# Patient Record
Sex: MALE | URBAN - METROPOLITAN AREA
[De-identification: names, ages, dates, MRNs, and addresses within clinical notes are randomized per-mention and may not be internally consistent; named-entity substitution may affect disease eponyms.]

---

## 2018-07-25 ENCOUNTER — PAT TELEPHONE (OUTPATIENT)
Dept: PREADMISSION TESTING | Age: 68
End: 2018-07-25

## 2018-07-25 VITALS — WEIGHT: 280 LBS | BODY MASS INDEX: 35.94 KG/M2 | HEIGHT: 74 IN

## 2018-07-25 RX ORDER — CARVEDILOL 25 MG/1
25 TABLET ORAL 2 TIMES DAILY WITH MEALS
COMMUNITY

## 2018-07-25 RX ORDER — WARFARIN SODIUM 7.5 MG/1
7.5 TABLET ORAL DAILY
COMMUNITY

## 2018-07-25 RX ORDER — EZETIMIBE 10 MG/1
10 TABLET ORAL NIGHTLY
COMMUNITY

## 2018-07-25 RX ORDER — LISINOPRIL 10 MG/1
10 TABLET ORAL 2 TIMES DAILY
COMMUNITY

## 2018-07-25 RX ORDER — ATORVASTATIN CALCIUM 40 MG/1
40 TABLET, FILM COATED ORAL NIGHTLY
COMMUNITY

## 2018-07-25 RX ORDER — WARFARIN SODIUM 10 MG/1
10 TABLET ORAL SEE ADMIN INSTRUCTIONS
COMMUNITY

## 2018-07-25 RX ORDER — FUROSEMIDE 20 MG/1
20 TABLET ORAL DAILY
COMMUNITY

## 2018-07-25 RX ORDER — MULTIVIT WITH MINERALS/LUTEIN
1000 TABLET ORAL DAILY
COMMUNITY

## 2018-07-25 RX ORDER — SPIRONOLACTONE 25 MG/1
25 TABLET ORAL DAILY
COMMUNITY

## 2018-07-25 NOTE — PRE-PROCEDURE INSTRUCTIONS
[facility] time_1100___________        Surgery time___1230_________    Take the following medications with a sip of water: LISINOPRIL,COREG    Do not eat or drink anything after 12:00 midnight prior to your surgery. This includes water chewing gum, mints and ice chips. You may brush your teeth and gargle the morning of your surgery, but do not swallow the water     Please see your family doctor/pediatrician for a history and physical and/or concerning medications. Bring any test results/reports from your physicians office. If you are under the care of a heart doctor or specialist doctor, please be aware that you may be asked to them for clearance    You may be asked to stop blood thinners such as Coumadin, Plavix, Fragmin, Lovenox, etc., or any anti-inflammatories such as:  Aspirin, Ibuprofen, Advil, Naproxen prior to your surgery. We also ask that you stop any OTC medications such as fish oil, vitamin E, glucosamine, garlic, Multivitamins, COQ 10, etc.MAY TAKE TYLENOLWe ask that you do not smoke 24 hours prior to surgery  We ask that you do not  drink any alcoholic beverages 24 hours prior to surgery     You must make arrangements for a responsible adult to take you home after your surgery. For your safety you will not be allowed to leave alone or drive yourself home. Your surgery will be cancelled if you do not have a ride home. Also for your safety, it is strongly suggested that someone stay with you the first 24 hours after your surgery. A parent or legal guardian must accompany a child scheduled for surgery and plan to stay at the hospital until the child is discharged. Please do not bring other children with you. For your comfort, please wear simple loose fitting clothing to the hospital.  Please do not bring valuables.     Do not wear any make-up or nail polish on your fingers or toes      For your safety, please do not wear any jewelry or body piercing's on the day of surgery. All jewelry must be removed. If you have dentures, they will be removed before going to operating room. For your convenience, we will provide you with a container. If you wear contact lenses or glasses, they will be removed, please bring a case for them. If you have a living will and a durable power of  for healthcare, please bring in a copy. As part of our patient safety program to minimize surgical site infections, we ask you to do the following:    · Please notify your surgeon if you develop any illness between         now and the  day of your surgery. · This includes a cough, cold, fever, sore throat, nausea,         or vomiting, and diarrhea, etc.  ·  Please notify your surgeon if you experience dizziness, shortness         of breath or blurred vision between now and the time of your surgery. Do not shave your operative site 96 hours prior to surgery. For face and neck surgery, men may use an electric razor 48 hours   prior to surgery. You may shower the night before surgery or the morning of   your surgery with an antibacterial soap. You will need to bring a photo ID and insurance card    [facility] has an onsite pharmacy, would you like to utilize our pharmacy     If you will be staying overnight and use a C-pap machine, please bring   your C-pap to hospital     Our goal is to provide you with excellent care, therefore, visitors will be limited to two(2) in the room at a time so that we may focus on providing this care for you. Please contact pre-admission testing if you have any further questions. [facility] phone number:  [phone] [facility] fax number:  [phone]  Please note these are generalized instructions for all surgical cases, you may be provided with more specific instructions according to your surgery.

## 2018-07-26 ENCOUNTER — HOSPITAL ENCOUNTER (OUTPATIENT)
Dept: ENDOSCOPY | Age: 68
Discharge: HOME | End: 2018-07-26
Attending: INTERNAL MEDICINE | Admitting: INTERNAL MEDICINE

## 2018-07-26 VITALS
TEMPERATURE: 97.2 F | DIASTOLIC BLOOD PRESSURE: 77 MMHG | RESPIRATION RATE: 16 BRPM | WEIGHT: 283.18 LBS | SYSTOLIC BLOOD PRESSURE: 122 MMHG | OXYGEN SATURATION: 96 % | BODY MASS INDEX: 36.36 KG/M2 | HEART RATE: 66 BPM

## 2018-07-26 LAB
INR BLD: 1.2 (ref 0.86–1.14)
PROTHROMBIN TIME: 13.7 SEC (ref 9.8–13)

## 2018-07-26 RX ORDER — SODIUM CHLORIDE 0.9 % (FLUSH) 0.9 %
10 SYRINGE (ML) INJECTION EVERY 12 HOURS SCHEDULED
Status: DISCONTINUED | OUTPATIENT
Start: 2018-07-26 | End: 2018-07-27 | Stop reason: HOSPADM

## 2018-07-26 RX ORDER — SODIUM CHLORIDE 0.9 % (FLUSH) 0.9 %
10 SYRINGE (ML) INJECTION PRN
Status: DISCONTINUED | OUTPATIENT
Start: 2018-07-26 | End: 2018-07-27 | Stop reason: HOSPADM

## 2018-07-26 RX ORDER — ONDANSETRON 2 MG/ML
4 INJECTION INTRAMUSCULAR; INTRAVENOUS
Status: ACTIVE | OUTPATIENT
Start: 2018-07-26 | End: 2018-07-26

## 2018-07-26 RX ORDER — SODIUM CHLORIDE 9 MG/ML
INJECTION, SOLUTION INTRAVENOUS CONTINUOUS
Status: DISCONTINUED | OUTPATIENT
Start: 2018-07-26 | End: 2018-07-27 | Stop reason: HOSPADM

## 2018-07-26 RX ADMIN — SODIUM CHLORIDE: 9 INJECTION, SOLUTION INTRAVENOUS at 09:52

## 2018-07-26 ASSESSMENT — PAIN SCALES - GENERAL
PAINLEVEL_OUTOF10: 0

## 2018-07-26 ASSESSMENT — ENCOUNTER SYMPTOMS: SHORTNESS OF BREATH: 0

## 2018-07-26 ASSESSMENT — PAIN - FUNCTIONAL ASSESSMENT: PAIN_FUNCTIONAL_ASSESSMENT: 0-10

## 2018-07-26 ASSESSMENT — PAIN DESCRIPTION - PAIN TYPE
TYPE: SURGICAL PAIN
TYPE: SURGICAL PAIN

## 2018-07-26 NOTE — ANESTHESIA POST-OP
[facility] Department of Anesthesiology  Post-Anesthesia Note       Name:  Home Echols                                         Age:  74 y.o.   MRN:  [id number]     Last Vitals & Oxygen Saturation: /77   Pulse 66   Temp 97.2 °F (36.2 °C) (Temporal)   Resp 16   Wt 283 lb 2.9 oz (128.4 kg)   SpO2 96%   BMI 36.36 kg/m²   Patient Vitals for the past 4 hrs:   BP Temp Temp src Pulse Resp SpO2 Weight   07/26/18 1157 122/77 - - 66 16 96 % -   07/26/18 1130 105/61 97.2 °F (36.2 °C) Temporal 66 16 93 % -   07/26/18 1115 - - - - - 97 % -   07/26/18 1111 104/69 - - 72 22 - -   07/26/18 1110 99/71 - - 74 19 98 % -   07/26/18 1100 89/62 98.1 °F (36.7 °C) Temporal 73 20 95 % -   07/26/18 0939 119/72 96.8 °F (36 °C) Temporal 83 20 93 % 283 lb 2.9 oz (128.4 kg)       Level of consciousness: awake, alert and oriented    Respiratory: stable     Cardiovascular: stable     Hydration: stable     PONV: stable     Post-op pain: adequate analgesia    Post-op assessment: no apparent anesthetic complications    Complications:  none    Filipe Medley MD  July 26, 2018   12:16 PM

## 2018-07-26 NOTE — OP NOTE
cecum was identified by characteristic anatomy and ballottment. The ileocecal valve was identified. The preparation was fair. The terminal ileum was not visualized. The scope was then withdrawn back through the cecum, ascending, transverse, descending, sigmoid colon, and rectum. Careful circumferential examination of the mucosa in these areas demonstrated:    1) A 3 mm polyp in the proximal ascending colon was removed completely with biopsy polypectomy. 2) Mild right and left sided diverticulosis was noted. 3) A 3 mm polyp in the sigmoid colon was removed completely with biopsy polypectomy. The scope was then withdrawn into the rectum and retroflexed. The retroflexed view of the anal verge and rectum demonstrates small internal hemorrhoids. The scope was straightened, the colon was decompressed and the scope was withdrawn from the patient. The patient tolerated the procedure well and was taken to the PACU in good condition. Estimated blood loss: None    Impression:  See post-procedure diagnoses. Recommendations:  Await pathology. Repeat colonoscopy in 5 years. The patient had colon polyp(s) successfully removed today. Paulo Daugherty is a small risk for these sites to bleed for up to several weeks out from the procedure. The patient is instructed to call if there is any significant amounts of  rectal bleeding, abdominal pain, dizziness, or other complaints thought to be related to the procedure.       Antonio Garcia, [address] [facility] and [facility]  7/26/2018  [phone]

## 2018-07-26 NOTE — PROGRESS NOTES
From PACU. Alert and oriented. No c/o. Vss. abd soft.
To PACU by cart from Endo. On monitors. . VSS. Abd soft and rounded. Denies complaints. Resting quietly on left side.
VSS. Stable for transfer to ACU by cart. Denies complaints.
established preoperatively. 23.  The patient/caregiver demonstrates knowledge of the expected responses to the operative or invasive procedure. 20.  Patient/Caregiver has reduced anxiety. Interventions-Familiarize with environment and equipment.   21.  Other:  22.  Other:

## 2018-07-26 NOTE — H&P
Pre-operative History and Physical    Patient: Geovany Jacques  : 1950  Acct#:     Intended Procedure: Colonoscopy     HISTORY OF PRESENT ILLNESS:  The patient is a 74 y.o. male  who presents for/due to History of colon polyps. Surveillance colonoscopy.          Past Medical History:        Diagnosis Date    CAD (coronary artery disease)     Cardiac resynchronization therapy defibrillator (CRT-D) in place     CHF [facility]estive heart failure) ([facility] [address]     Hyperlipidemia     Hypertension     MI (myocardial infarction)     Wears glasses      Past Surgical History:        Procedure Laterality Date    CARDIAC DEFIBRILLATOR PLACEMENT      CRT    COLONOSCOPY      CORONARY ANGIOPLASTY WITH STENT PLACEMENT  2015    OTHER SURGICAL HISTORY Left     BENIGHN TUMOR REMOVED LEFT FOOT    PACEMAKER PLACEMENT  2015    ICD     Medications Prior to Admission:   Current Outpatient Prescriptions on File Prior to Encounter   Medication Sig Dispense Refill    atorvastatin (LIPITOR) 40 MG tablet Take 40 mg by mouth nightly      carvedilol (COREG) 25 MG tablet Take 25 mg by mouth 2 times daily (with meals)      ezetimibe (ZETIA) 10 MG tablet Take 10 mg by mouth nightly      furosemide (LASIX) 20 MG tablet Take 20 mg by mouth daily      lisinopril (PRINIVIL;ZESTRIL) 10 MG tablet Take 10 mg by mouth 2 times daily      spironolactone (ALDACTONE) 25 MG tablet Take 25 mg by mouth daily      aspirin 81 MG tablet Take 81 mg by mouth daily      Ascorbic Acid (VITAMIN C) 1000 MG tablet Take 1,000 mg by mouth daily      Cholecalciferol (VITAMIN D3) 5000 units TABS Take 1 tablet by mouth daily      warfarin (COUMADIN) 7.5 MG tablet Take 7.5 mg by mouth daily 6DAYS A WEEK-SUN,TUES,WED,THURS,FRI,SAT      warfarin (COUMADIN) 10 MG tablet Take 10 mg by mouth See Admin Instructions       Multiple Vitamins-Minerals (MULTIVITAMIN ADULT PO) Take 1 tablet by mouth daily       No current facility-administered

## 2022-11-24 ENCOUNTER — APPOINTMENT (OUTPATIENT)
Dept: CT IMAGING | Age: 72
DRG: 308 | End: 2022-11-24
Payer: MEDICARE

## 2022-11-24 ENCOUNTER — HOSPITAL ENCOUNTER (INPATIENT)
Age: 72
LOS: 1 days | Discharge: ANOTHER HOSPITAL | DRG: 308 | End: 2022-11-25
Attending: EMERGENCY MEDICINE | Admitting: INTERNAL MEDICINE
Payer: MEDICARE

## 2022-11-24 ENCOUNTER — APPOINTMENT (OUTPATIENT)
Dept: GENERAL RADIOLOGY | Age: 72
DRG: 308 | End: 2022-11-24
Payer: MEDICARE

## 2022-11-24 DIAGNOSIS — I49.01 CARDIAC ARREST WITH VENTRICULAR FIBRILLATION (HCC): Primary | ICD-10-CM

## 2022-11-24 DIAGNOSIS — R79.89 ELEVATED LACTIC ACID LEVEL: ICD-10-CM

## 2022-11-24 DIAGNOSIS — E83.41 HYPERMAGNESEMIA: ICD-10-CM

## 2022-11-24 DIAGNOSIS — I46.9 CARDIAC ARREST WITH VENTRICULAR FIBRILLATION (HCC): Primary | ICD-10-CM

## 2022-11-24 LAB
A/G RATIO: 0.9 (ref 1.1–2.2)
ACETAMINOPHEN LEVEL: <5 UG/ML (ref 10–30)
ALBUMIN SERPL-MCNC: 3.2 G/DL (ref 3.4–5)
ALP BLD-CCNC: 120 U/L (ref 40–129)
ALT SERPL-CCNC: 67 U/L (ref 10–40)
ANION GAP SERPL CALCULATED.3IONS-SCNC: 19 MMOL/L (ref 3–16)
APTT: 43.7 SEC (ref 23–34.3)
AST SERPL-CCNC: 92 U/L (ref 15–37)
BILIRUB SERPL-MCNC: 0.3 MG/DL (ref 0–1)
BUN BLDV-MCNC: 15 MG/DL (ref 7–20)
CALCIUM SERPL-MCNC: 9.7 MG/DL (ref 8.3–10.6)
CHLORIDE BLD-SCNC: 99 MMOL/L (ref 99–110)
CO2: 23 MMOL/L (ref 21–32)
CREAT SERPL-MCNC: 1.8 MG/DL (ref 0.8–1.3)
ETHANOL: NORMAL MG/DL (ref 0–0.08)
GFR SERPL CREATININE-BSD FRML MDRD: 40 ML/MIN/{1.73_M2}
GLUCOSE BLD-MCNC: 227 MG/DL (ref 70–99)
HCT VFR BLD CALC: 43.3 % (ref 40.5–52.5)
HEMOGLOBIN: 13.5 G/DL (ref 13.5–17.5)
INR BLD: 2.43 (ref 0.87–1.14)
LACTIC ACID: 5.7 MMOL/L (ref 0.4–2)
MAGNESIUM: 2.5 MG/DL (ref 1.8–2.4)
MCH RBC QN AUTO: 26.6 PG (ref 26–34)
MCHC RBC AUTO-ENTMCNC: 31.1 G/DL (ref 31–36)
MCV RBC AUTO: 85.4 FL (ref 80–100)
PDW BLD-RTO: 14.6 % (ref 12.4–15.4)
PHOSPHORUS: 10.2 MG/DL (ref 2.5–4.9)
PLATELET # BLD: 249 K/UL (ref 135–450)
PMV BLD AUTO: 9.2 FL (ref 5–10.5)
POTASSIUM REFLEX MAGNESIUM: 4.3 MMOL/L (ref 3.5–5.1)
PROCALCITONIN: 0.03 NG/ML (ref 0–0.15)
PROTHROMBIN TIME: 26.5 SEC (ref 11.7–14.5)
RBC # BLD: 5.07 M/UL (ref 4.2–5.9)
REASON FOR REJECTION: NORMAL
REJECTED TEST: NORMAL
SALICYLATE, SERUM: <0.3 MG/DL (ref 15–30)
SODIUM BLD-SCNC: 141 MMOL/L (ref 136–145)
TOTAL PROTEIN: 6.8 G/DL (ref 6.4–8.2)
TROPONIN: <0.01 NG/ML
WBC # BLD: 9.8 K/UL (ref 4–11)

## 2022-11-24 PROCEDURE — 82077 ASSAY SPEC XCP UR&BREATH IA: CPT

## 2022-11-24 PROCEDURE — 2100000000 HC CCU R&B

## 2022-11-24 PROCEDURE — 99291 CRITICAL CARE FIRST HOUR: CPT

## 2022-11-24 PROCEDURE — 80053 COMPREHEN METABOLIC PANEL: CPT

## 2022-11-24 PROCEDURE — 2700000000 HC OXYGEN THERAPY PER DAY

## 2022-11-24 PROCEDURE — 96376 TX/PRO/DX INJ SAME DRUG ADON: CPT

## 2022-11-24 PROCEDURE — 84145 PROCALCITONIN (PCT): CPT

## 2022-11-24 PROCEDURE — 84100 ASSAY OF PHOSPHORUS: CPT

## 2022-11-24 PROCEDURE — 84484 ASSAY OF TROPONIN QUANT: CPT

## 2022-11-24 PROCEDURE — 85610 PROTHROMBIN TIME: CPT

## 2022-11-24 PROCEDURE — 80179 DRUG ASSAY SALICYLATE: CPT

## 2022-11-24 PROCEDURE — 93005 ELECTROCARDIOGRAM TRACING: CPT | Performed by: EMERGENCY MEDICINE

## 2022-11-24 PROCEDURE — 2580000003 HC RX 258: Performed by: EMERGENCY MEDICINE

## 2022-11-24 PROCEDURE — 6360000002 HC RX W HCPCS: Performed by: EMERGENCY MEDICINE

## 2022-11-24 PROCEDURE — 96374 THER/PROPH/DIAG INJ IV PUSH: CPT

## 2022-11-24 PROCEDURE — 36415 COLL VENOUS BLD VENIPUNCTURE: CPT

## 2022-11-24 PROCEDURE — 83735 ASSAY OF MAGNESIUM: CPT

## 2022-11-24 PROCEDURE — 2500000003 HC RX 250 WO HCPCS: Performed by: EMERGENCY MEDICINE

## 2022-11-24 PROCEDURE — 96375 TX/PRO/DX INJ NEW DRUG ADDON: CPT

## 2022-11-24 PROCEDURE — 71045 X-RAY EXAM CHEST 1 VIEW: CPT

## 2022-11-24 PROCEDURE — 94002 VENT MGMT INPAT INIT DAY: CPT

## 2022-11-24 PROCEDURE — 0BH17EZ INSERTION OF ENDOTRACHEAL AIRWAY INTO TRACHEA, VIA NATURAL OR ARTIFICIAL OPENING: ICD-10-PCS | Performed by: INTERNAL MEDICINE

## 2022-11-24 PROCEDURE — 70450 CT HEAD/BRAIN W/O DYE: CPT

## 2022-11-24 PROCEDURE — 80143 DRUG ASSAY ACETAMINOPHEN: CPT

## 2022-11-24 PROCEDURE — 83605 ASSAY OF LACTIC ACID: CPT

## 2022-11-24 PROCEDURE — 96365 THER/PROPH/DIAG IV INF INIT: CPT

## 2022-11-24 PROCEDURE — 74176 CT ABD & PELVIS W/O CONTRAST: CPT

## 2022-11-24 PROCEDURE — 85027 COMPLETE CBC AUTOMATED: CPT

## 2022-11-24 PROCEDURE — 85730 THROMBOPLASTIN TIME PARTIAL: CPT

## 2022-11-24 PROCEDURE — 94761 N-INVAS EAR/PLS OXIMETRY MLT: CPT

## 2022-11-24 PROCEDURE — 5A1935Z RESPIRATORY VENTILATION, LESS THAN 24 CONSECUTIVE HOURS: ICD-10-PCS | Performed by: INTERNAL MEDICINE

## 2022-11-24 RX ORDER — MIDAZOLAM HYDROCHLORIDE 5 MG/ML
8 INJECTION INTRAMUSCULAR; INTRAVENOUS ONCE
Status: COMPLETED | OUTPATIENT
Start: 2022-11-24 | End: 2022-11-24

## 2022-11-24 RX ORDER — PROPOFOL 10 MG/ML
INJECTION, EMULSION INTRAVENOUS
Status: DISPENSED
Start: 2022-11-24 | End: 2022-11-25

## 2022-11-24 RX ORDER — FENTANYL CITRATE-0.9 % NACL/PF 10 MCG/ML
25-200 PLASTIC BAG, INJECTION (ML) INTRAVENOUS CONTINUOUS
Status: DISCONTINUED | OUTPATIENT
Start: 2022-11-24 | End: 2022-11-24 | Stop reason: SDUPTHER

## 2022-11-24 RX ORDER — MIDAZOLAM HYDROCHLORIDE 2 MG/2ML
4 INJECTION, SOLUTION INTRAMUSCULAR; INTRAVENOUS ONCE
Status: COMPLETED | OUTPATIENT
Start: 2022-11-24 | End: 2022-11-24

## 2022-11-24 RX ORDER — 0.9 % SODIUM CHLORIDE 0.9 %
30 INTRAVENOUS SOLUTION INTRAVENOUS ONCE
Status: DISCONTINUED | OUTPATIENT
Start: 2022-11-24 | End: 2022-11-25

## 2022-11-24 RX ORDER — NOREPINEPHRINE BIT/0.9 % NACL 16MG/250ML
1-100 INFUSION BOTTLE (ML) INTRAVENOUS CONTINUOUS
Status: DISCONTINUED | OUTPATIENT
Start: 2022-11-24 | End: 2022-11-25 | Stop reason: HOSPADM

## 2022-11-24 RX ADMIN — Medication 2 MG/HR: at 20:50

## 2022-11-24 RX ADMIN — Medication 50 MCG/HR: at 22:22

## 2022-11-24 RX ADMIN — MIDAZOLAM 4 MG: 1 INJECTION INTRAMUSCULAR; INTRAVENOUS at 20:36

## 2022-11-24 RX ADMIN — Medication 5 MCG/MIN: at 20:40

## 2022-11-24 RX ADMIN — MIDAZOLAM 8 MG: 5 INJECTION INTRAMUSCULAR; INTRAVENOUS at 21:14

## 2022-11-24 ASSESSMENT — PULMONARY FUNCTION TESTS
PIF_VALUE: 32
PIF_VALUE: 32

## 2022-11-25 ENCOUNTER — APPOINTMENT (OUTPATIENT)
Dept: GENERAL RADIOLOGY | Age: 72
DRG: 308 | End: 2022-11-25
Payer: MEDICARE

## 2022-11-25 VITALS
HEART RATE: 61 BPM | SYSTOLIC BLOOD PRESSURE: 125 MMHG | TEMPERATURE: 91.2 F | DIASTOLIC BLOOD PRESSURE: 85 MMHG | WEIGHT: 294.31 LBS | HEIGHT: 74 IN | BODY MASS INDEX: 37.77 KG/M2 | OXYGEN SATURATION: 100 % | RESPIRATION RATE: 7 BRPM

## 2022-11-25 PROBLEM — I25.2 HISTORY OF MI (MYOCARDIAL INFARCTION): Status: ACTIVE | Noted: 2022-11-25

## 2022-11-25 PROBLEM — E78.5 HLD (HYPERLIPIDEMIA): Status: ACTIVE | Noted: 2022-11-25

## 2022-11-25 PROBLEM — I50.20 HFREF (HEART FAILURE WITH REDUCED EJECTION FRACTION) (HCC): Status: ACTIVE | Noted: 2022-11-25

## 2022-11-25 PROBLEM — I48.91 ATRIAL FIBRILLATION (HCC): Status: ACTIVE | Noted: 2022-11-25

## 2022-11-25 PROBLEM — Z95.0 S/P PLACEMENT OF CARDIAC PACEMAKER: Status: ACTIVE | Noted: 2022-11-25

## 2022-11-25 PROBLEM — N17.9 AKI (ACUTE KIDNEY INJURY) (HCC): Status: ACTIVE | Noted: 2022-11-25

## 2022-11-25 PROBLEM — I10 HTN (HYPERTENSION): Status: ACTIVE | Noted: 2022-11-25

## 2022-11-25 LAB
ALBUMIN SERPL-MCNC: 3.2 G/DL (ref 3.4–5)
ALP BLD-CCNC: 142 U/L (ref 40–129)
ALT SERPL-CCNC: 128 U/L (ref 10–40)
AMPHETAMINE SCREEN, URINE: ABNORMAL
ANION GAP SERPL CALCULATED.3IONS-SCNC: 15 MMOL/L (ref 3–16)
ANION GAP SERPL CALCULATED.3IONS-SCNC: 16 MMOL/L (ref 3–16)
APTT: 40.5 SEC (ref 23–34.3)
AST SERPL-CCNC: 136 U/L (ref 15–37)
BACTERIA: ABNORMAL /HPF
BARBITURATE SCREEN URINE: ABNORMAL
BASE EXCESS ARTERIAL: -4 MMOL/L (ref -3–3)
BASE EXCESS ARTERIAL: -4.6 MMOL/L (ref -3–3)
BASE EXCESS ARTERIAL: -5 (ref -3–3)
BASE EXCESS ARTERIAL: -5 (ref -3–3)
BENZODIAZEPINE SCREEN, URINE: POSITIVE
BILIRUB SERPL-MCNC: 0.5 MG/DL (ref 0–1)
BILIRUBIN DIRECT: <0.2 MG/DL (ref 0–0.3)
BILIRUBIN URINE: NEGATIVE
BILIRUBIN, INDIRECT: ABNORMAL MG/DL (ref 0–1)
BLOOD, URINE: ABNORMAL
BUN BLDV-MCNC: 24 MG/DL (ref 7–20)
BUN BLDV-MCNC: 28 MG/DL (ref 7–20)
CALCIUM IONIZED: 1.11 MMOL/L (ref 1.12–1.32)
CALCIUM IONIZED: 1.12 MMOL/L (ref 1.12–1.32)
CALCIUM IONIZED: 1.19 MMOL/L (ref 1.12–1.32)
CALCIUM SERPL-MCNC: 9 MG/DL (ref 8.3–10.6)
CALCIUM SERPL-MCNC: 9.1 MG/DL (ref 8.3–10.6)
CANNABINOID SCREEN URINE: ABNORMAL
CARBOXYHEMOGLOBIN ARTERIAL: 0.6 % (ref 0–1.5)
CARBOXYHEMOGLOBIN ARTERIAL: 0.8 % (ref 0–1.5)
CHLORIDE BLD-SCNC: 104 MMOL/L (ref 99–110)
CHLORIDE BLD-SCNC: 106 MMOL/L (ref 99–110)
CLARITY: ABNORMAL
CO2: 19 MMOL/L (ref 21–32)
CO2: 22 MMOL/L (ref 21–32)
COCAINE METABOLITE SCREEN URINE: ABNORMAL
COLOR: ABNORMAL
CREAT SERPL-MCNC: 1.7 MG/DL (ref 0.8–1.3)
CREAT SERPL-MCNC: 1.8 MG/DL (ref 0.8–1.3)
EKG ATRIAL RATE: 62 BPM
EKG ATRIAL RATE: 95 BPM
EKG DIAGNOSIS: NORMAL
EKG DIAGNOSIS: NORMAL
EKG P AXIS: 59 DEGREES
EKG P-R INTERVAL: 126 MS
EKG P-R INTERVAL: 84 MS
EKG Q-T INTERVAL: 392 MS
EKG Q-T INTERVAL: 498 MS
EKG QRS DURATION: 114 MS
EKG QRS DURATION: 150 MS
EKG QTC CALCULATION (BAZETT): 397 MS
EKG QTC CALCULATION (BAZETT): 625 MS
EKG R AXIS: 119 DEGREES
EKG R AXIS: 162 DEGREES
EKG T AXIS: -66 DEGREES
EKG T AXIS: 111 DEGREES
EKG VENTRICULAR RATE: 62 BPM
EKG VENTRICULAR RATE: 95 BPM
EPITHELIAL CELLS, UA: 4 /HPF (ref 0–5)
FENTANYL SCREEN, URINE: ABNORMAL
GFR SERPL CREATININE-BSD FRML MDRD: 40 ML/MIN/{1.73_M2}
GFR SERPL CREATININE-BSD FRML MDRD: 43 ML/MIN/{1.73_M2}
GLUCOSE BLD-MCNC: 148 MG/DL (ref 70–99)
GLUCOSE BLD-MCNC: 152 MG/DL (ref 70–99)
GLUCOSE BLD-MCNC: 160 MG/DL (ref 70–99)
GLUCOSE BLD-MCNC: 161 MG/DL (ref 70–99)
GLUCOSE BLD-MCNC: 178 MG/DL (ref 70–99)
GLUCOSE BLD-MCNC: 179 MG/DL (ref 70–99)
GLUCOSE URINE: 100 MG/DL
HAV IGM SER IA-ACNC: NORMAL
HCO3 ARTERIAL: 21 MMOL/L (ref 21–29)
HCO3 ARTERIAL: 21.7 MMOL/L (ref 21–29)
HCO3 ARTERIAL: 22 MMOL/L (ref 21–29)
HCO3 ARTERIAL: 24.7 MMOL/L (ref 21–29)
HEMOGLOBIN, ART, EXTENDED: 14.8 G/DL (ref 13.5–17.5)
HEMOGLOBIN, ART, EXTENDED: 15.5 G/DL (ref 13.5–17.5)
HEPATITIS B CORE IGM ANTIBODY: NORMAL
HEPATITIS B SURFACE ANTIGEN INTERPRETATION: NORMAL
HEPATITIS C ANTIBODY INTERPRETATION: NORMAL
HYALINE CASTS: 32 /LPF (ref 0–8)
HYALINE CASTS: PRESENT
INR BLD: 2.69 (ref 0.87–1.14)
KETONES, URINE: ABNORMAL MG/DL
LACTATE: 1.32 MMOL/L (ref 0.4–2)
LACTIC ACID: 1.3 MMOL/L (ref 0.4–2)
LACTIC ACID: 1.6 MMOL/L (ref 0.4–2)
LEUKOCYTE ESTERASE, URINE: ABNORMAL
Lab: ABNORMAL
MAGNESIUM: 2 MG/DL (ref 1.8–2.4)
MAGNESIUM: 2 MG/DL (ref 1.8–2.4)
MAGNESIUM: 2.2 MG/DL (ref 1.8–2.4)
METHADONE SCREEN, URINE: ABNORMAL
METHEMOGLOBIN ARTERIAL: 0 %
METHEMOGLOBIN ARTERIAL: 0.6 %
MICROSCOPIC EXAMINATION: YES
NITRITE, URINE: NEGATIVE
O2 SAT, ARTERIAL: 100 %
O2 SAT, ARTERIAL: 98 % (ref 93–100)
O2 SAT, ARTERIAL: 98 % (ref 93–100)
O2 SAT, ARTERIAL: 98.8 %
O2 THERAPY: ABNORMAL
O2 THERAPY: ABNORMAL
OPIATE SCREEN URINE: ABNORMAL
ORGANISM: ABNORMAL
OXYCODONE URINE: ABNORMAL
PCO2 ARTERIAL: 39.2 MM HG (ref 35–45)
PCO2 ARTERIAL: 45.2 MMHG (ref 35–45)
PCO2 ARTERIAL: 48.1 MM HG (ref 35–45)
PCO2 ARTERIAL: 58.8 MMHG (ref 35–45)
PERFORMED ON: ABNORMAL
PH ARTERIAL: 7.23 (ref 7.35–7.45)
PH ARTERIAL: 7.26 (ref 7.35–7.45)
PH ARTERIAL: 7.3 (ref 7.35–7.45)
PH ARTERIAL: 7.34 (ref 7.35–7.45)
PH UA: 5.5
PH UA: 5.5 (ref 5–8)
PH VENOUS: 7.29 (ref 7.35–7.45)
PH VENOUS: 7.3 (ref 7.35–7.45)
PHENCYCLIDINE SCREEN URINE: ABNORMAL
PHOSPHORUS: 3 MG/DL (ref 2.5–4.9)
PHOSPHORUS: 5.4 MG/DL (ref 2.5–4.9)
PO2 ARTERIAL: 113.9 MM HG (ref 75–108)
PO2 ARTERIAL: 118 MMHG (ref 75–108)
PO2 ARTERIAL: 126 MM HG (ref 75–108)
PO2 ARTERIAL: 386 MMHG (ref 75–108)
POC SAMPLE TYPE: ABNORMAL
POC SAMPLE TYPE: ABNORMAL
POTASSIUM SERPL-SCNC: 4.8 MMOL/L (ref 3.5–5.1)
POTASSIUM SERPL-SCNC: 5.8 MMOL/L (ref 3.5–5.1)
PROTEIN UA: >=1000 MG/DL
PROTHROMBIN TIME: 28.7 SEC (ref 11.7–14.5)
RBC UA: 1161 /HPF (ref 0–4)
REPORT: NORMAL
RESPIRATORY PANEL PCR: ABNORMAL
SODIUM BLD-SCNC: 141 MMOL/L (ref 136–145)
SODIUM BLD-SCNC: 141 MMOL/L (ref 136–145)
SPECIFIC GRAVITY UA: 1.02 (ref 1–1.03)
TCO2 ARTERIAL: 22 MMOL/L
TCO2 ARTERIAL: 23 MMOL/L
TCO2 ARTERIAL: 52.5 MMOL/L
TCO2 ARTERIAL: 59.3 MMOL/L
TOTAL PROTEIN: 6.8 G/DL (ref 6.4–8.2)
TROPONIN: 0.05 NG/ML
URINE TYPE: ABNORMAL
UROBILINOGEN, URINE: 1 E.U./DL
WBC UA: 33 /HPF (ref 0–5)

## 2022-11-25 PROCEDURE — 83605 ASSAY OF LACTIC ACID: CPT

## 2022-11-25 PROCEDURE — 02HV33Z INSERTION OF INFUSION DEVICE INTO SUPERIOR VENA CAVA, PERCUTANEOUS APPROACH: ICD-10-PCS | Performed by: INTERNAL MEDICINE

## 2022-11-25 PROCEDURE — 84100 ASSAY OF PHOSPHORUS: CPT

## 2022-11-25 PROCEDURE — 36415 COLL VENOUS BLD VENIPUNCTURE: CPT

## 2022-11-25 PROCEDURE — 6370000000 HC RX 637 (ALT 250 FOR IP): Performed by: INTERNAL MEDICINE

## 2022-11-25 PROCEDURE — 2580000003 HC RX 258: Performed by: INTERNAL MEDICINE

## 2022-11-25 PROCEDURE — 83735 ASSAY OF MAGNESIUM: CPT

## 2022-11-25 PROCEDURE — 81001 URINALYSIS AUTO W/SCOPE: CPT

## 2022-11-25 PROCEDURE — 71045 X-RAY EXAM CHEST 1 VIEW: CPT

## 2022-11-25 PROCEDURE — 6370000000 HC RX 637 (ALT 250 FOR IP): Performed by: STUDENT IN AN ORGANIZED HEALTH CARE EDUCATION/TRAINING PROGRAM

## 2022-11-25 PROCEDURE — 94003 VENT MGMT INPAT SUBQ DAY: CPT

## 2022-11-25 PROCEDURE — 6360000002 HC RX W HCPCS: Performed by: EMERGENCY MEDICINE

## 2022-11-25 PROCEDURE — C9113 INJ PANTOPRAZOLE SODIUM, VIA: HCPCS | Performed by: STUDENT IN AN ORGANIZED HEALTH CARE EDUCATION/TRAINING PROGRAM

## 2022-11-25 PROCEDURE — 83036 HEMOGLOBIN GLYCOSYLATED A1C: CPT

## 2022-11-25 PROCEDURE — 94761 N-INVAS EAR/PLS OXIMETRY MLT: CPT

## 2022-11-25 PROCEDURE — 36556 INSERT NON-TUNNEL CV CATH: CPT

## 2022-11-25 PROCEDURE — 87077 CULTURE AEROBIC IDENTIFY: CPT

## 2022-11-25 PROCEDURE — 84484 ASSAY OF TROPONIN QUANT: CPT

## 2022-11-25 PROCEDURE — 82947 ASSAY GLUCOSE BLOOD QUANT: CPT

## 2022-11-25 PROCEDURE — 85730 THROMBOPLASTIN TIME PARTIAL: CPT

## 2022-11-25 PROCEDURE — 80307 DRUG TEST PRSMV CHEM ANLYZR: CPT

## 2022-11-25 PROCEDURE — 80048 BASIC METABOLIC PNL TOTAL CA: CPT

## 2022-11-25 PROCEDURE — 82803 BLOOD GASES ANY COMBINATION: CPT

## 2022-11-25 PROCEDURE — 6360000002 HC RX W HCPCS: Performed by: INTERNAL MEDICINE

## 2022-11-25 PROCEDURE — 0202U NFCT DS 22 TRGT SARS-COV-2: CPT

## 2022-11-25 PROCEDURE — 87040 BLOOD CULTURE FOR BACTERIA: CPT

## 2022-11-25 PROCEDURE — 85610 PROTHROMBIN TIME: CPT

## 2022-11-25 PROCEDURE — 82330 ASSAY OF CALCIUM: CPT

## 2022-11-25 PROCEDURE — 80076 HEPATIC FUNCTION PANEL: CPT

## 2022-11-25 PROCEDURE — 87070 CULTURE OTHR SPECIMN AEROBIC: CPT

## 2022-11-25 PROCEDURE — 6360000002 HC RX W HCPCS: Performed by: STUDENT IN AN ORGANIZED HEALTH CARE EDUCATION/TRAINING PROGRAM

## 2022-11-25 PROCEDURE — 99291 CRITICAL CARE FIRST HOUR: CPT | Performed by: INTERNAL MEDICINE

## 2022-11-25 PROCEDURE — 87150 DNA/RNA AMPLIFIED PROBE: CPT

## 2022-11-25 PROCEDURE — 87205 SMEAR GRAM STAIN: CPT

## 2022-11-25 PROCEDURE — 93010 ELECTROCARDIOGRAM REPORT: CPT | Performed by: INTERNAL MEDICINE

## 2022-11-25 PROCEDURE — 80074 ACUTE HEPATITIS PANEL: CPT

## 2022-11-25 PROCEDURE — C8929 TTE W OR WO FOL WCON,DOPPLER: HCPCS

## 2022-11-25 PROCEDURE — 2580000003 HC RX 258: Performed by: EMERGENCY MEDICINE

## 2022-11-25 RX ORDER — SODIUM CHLORIDE 9 MG/ML
INJECTION, SOLUTION INTRAVENOUS CONTINUOUS
Status: DISCONTINUED | OUTPATIENT
Start: 2022-11-25 | End: 2022-11-25 | Stop reason: HOSPADM

## 2022-11-25 RX ORDER — POLYVINYL ALCOHOL 14 MG/ML
1 SOLUTION/ DROPS OPHTHALMIC EVERY 4 HOURS
Status: DISCONTINUED | OUTPATIENT
Start: 2022-11-25 | End: 2022-11-25 | Stop reason: HOSPADM

## 2022-11-25 RX ORDER — SODIUM CHLORIDE 9 MG/ML
INJECTION, SOLUTION INTRAVENOUS PRN
Status: DISCONTINUED | OUTPATIENT
Start: 2022-11-25 | End: 2022-11-25 | Stop reason: HOSPADM

## 2022-11-25 RX ORDER — NOREPINEPHRINE BIT/0.9 % NACL 16MG/250ML
1-50 INFUSION BOTTLE (ML) INTRAVENOUS CONTINUOUS
Status: DISCONTINUED | OUTPATIENT
Start: 2022-11-25 | End: 2022-11-25 | Stop reason: SDUPTHER

## 2022-11-25 RX ORDER — MEXILETINE HYDROCHLORIDE 150 MG/1
150 CAPSULE ORAL 3 TIMES DAILY
COMMUNITY

## 2022-11-25 RX ORDER — WARFARIN SODIUM 2.5 MG/1
2.5 TABLET ORAL WEEKLY
Status: DISCONTINUED | OUTPATIENT
Start: 2022-11-25 | End: 2022-11-25

## 2022-11-25 RX ORDER — HEPARIN SODIUM 10000 [USP'U]/100ML
5-30 INJECTION, SOLUTION INTRAVENOUS CONTINUOUS
Status: DISCONTINUED | OUTPATIENT
Start: 2022-11-25 | End: 2022-11-25 | Stop reason: HOSPADM

## 2022-11-25 RX ORDER — SACUBITRIL AND VALSARTAN 24; 26 MG/1; MG/1
1 TABLET, FILM COATED ORAL 2 TIMES DAILY
COMMUNITY

## 2022-11-25 RX ORDER — PROMETHAZINE HYDROCHLORIDE 25 MG/1
12.5 TABLET ORAL EVERY 6 HOURS PRN
Status: DISCONTINUED | OUTPATIENT
Start: 2022-11-25 | End: 2022-11-25 | Stop reason: HOSPADM

## 2022-11-25 RX ORDER — MEPERIDINE HYDROCHLORIDE 25 MG/ML
12.5 INJECTION INTRAMUSCULAR; INTRAVENOUS; SUBCUTANEOUS
Status: DISCONTINUED | OUTPATIENT
Start: 2022-11-25 | End: 2022-11-25 | Stop reason: HOSPADM

## 2022-11-25 RX ORDER — POTASSIUM CHLORIDE 7.45 MG/ML
10 INJECTION INTRAVENOUS PRN
Status: DISCONTINUED | OUTPATIENT
Start: 2022-11-25 | End: 2022-11-25 | Stop reason: HOSPADM

## 2022-11-25 RX ORDER — 0.9 % SODIUM CHLORIDE 0.9 %
30 INTRAVENOUS SOLUTION INTRAVENOUS ONCE
Status: COMPLETED | OUTPATIENT
Start: 2022-11-25 | End: 2022-11-25

## 2022-11-25 RX ORDER — PANTOPRAZOLE SODIUM 40 MG/10ML
40 INJECTION, POWDER, LYOPHILIZED, FOR SOLUTION INTRAVENOUS DAILY
Status: DISCONTINUED | OUTPATIENT
Start: 2022-11-25 | End: 2022-11-25 | Stop reason: HOSPADM

## 2022-11-25 RX ORDER — ACETAMINOPHEN 325 MG/1
650 TABLET ORAL EVERY 6 HOURS PRN
Status: DISCONTINUED | OUTPATIENT
Start: 2022-11-25 | End: 2022-11-25 | Stop reason: HOSPADM

## 2022-11-25 RX ORDER — HEPARIN SODIUM 1000 [USP'U]/ML
2000 INJECTION, SOLUTION INTRAVENOUS; SUBCUTANEOUS PRN
Status: DISCONTINUED | OUTPATIENT
Start: 2022-11-25 | End: 2022-11-25 | Stop reason: HOSPADM

## 2022-11-25 RX ORDER — ACETAMINOPHEN 650 MG/1
650 SUPPOSITORY RECTAL EVERY 6 HOURS PRN
Status: DISCONTINUED | OUTPATIENT
Start: 2022-11-25 | End: 2022-11-25 | Stop reason: HOSPADM

## 2022-11-25 RX ORDER — INSULIN LISPRO 100 [IU]/ML
0-4 INJECTION, SOLUTION INTRAVENOUS; SUBCUTANEOUS EVERY 4 HOURS
Status: DISCONTINUED | OUTPATIENT
Start: 2022-11-25 | End: 2022-11-25 | Stop reason: HOSPADM

## 2022-11-25 RX ORDER — HEPARIN SODIUM 1000 [USP'U]/ML
4000 INJECTION, SOLUTION INTRAVENOUS; SUBCUTANEOUS PRN
Status: DISCONTINUED | OUTPATIENT
Start: 2022-11-25 | End: 2022-11-25 | Stop reason: HOSPADM

## 2022-11-25 RX ORDER — PROPOFOL 10 MG/ML
5-50 INJECTION, EMULSION INTRAVENOUS CONTINUOUS
Status: DISCONTINUED | OUTPATIENT
Start: 2022-11-25 | End: 2022-11-25 | Stop reason: HOSPADM

## 2022-11-25 RX ORDER — WARFARIN SODIUM 7.5 MG/1
7.5 TABLET ORAL DAILY
Status: DISCONTINUED | OUTPATIENT
Start: 2022-11-25 | End: 2022-11-25

## 2022-11-25 RX ORDER — SODIUM CHLORIDE 0.9 % (FLUSH) 0.9 %
10 SYRINGE (ML) INJECTION PRN
Status: DISCONTINUED | OUTPATIENT
Start: 2022-11-25 | End: 2022-11-25 | Stop reason: HOSPADM

## 2022-11-25 RX ORDER — DEXTROSE MONOHYDRATE 100 MG/ML
INJECTION, SOLUTION INTRAVENOUS CONTINUOUS PRN
Status: DISCONTINUED | OUTPATIENT
Start: 2022-11-25 | End: 2022-11-25 | Stop reason: HOSPADM

## 2022-11-25 RX ORDER — ONDANSETRON 2 MG/ML
4 INJECTION INTRAMUSCULAR; INTRAVENOUS EVERY 6 HOURS PRN
Status: DISCONTINUED | OUTPATIENT
Start: 2022-11-25 | End: 2022-11-25 | Stop reason: HOSPADM

## 2022-11-25 RX ORDER — SODIUM CHLORIDE 0.9 % (FLUSH) 0.9 %
10 SYRINGE (ML) INJECTION EVERY 12 HOURS SCHEDULED
Status: DISCONTINUED | OUTPATIENT
Start: 2022-11-25 | End: 2022-11-25 | Stop reason: HOSPADM

## 2022-11-25 RX ORDER — CHLORHEXIDINE GLUCONATE 0.12 MG/ML
15 RINSE ORAL 2 TIMES DAILY
Status: DISCONTINUED | OUTPATIENT
Start: 2022-11-25 | End: 2022-11-25 | Stop reason: HOSPADM

## 2022-11-25 RX ORDER — HEPARIN SODIUM 1000 [USP'U]/ML
4000 INJECTION, SOLUTION INTRAVENOUS; SUBCUTANEOUS ONCE
Status: DISCONTINUED | OUTPATIENT
Start: 2022-11-25 | End: 2022-11-25 | Stop reason: HOSPADM

## 2022-11-25 RX ORDER — MAGNESIUM SULFATE IN WATER 40 MG/ML
2000 INJECTION, SOLUTION INTRAVENOUS PRN
Status: DISCONTINUED | OUTPATIENT
Start: 2022-11-25 | End: 2022-11-25 | Stop reason: HOSPADM

## 2022-11-25 RX ORDER — ACETAMINOPHEN 325 MG/1
650 TABLET ORAL EVERY 6 HOURS
Status: DISCONTINUED | OUTPATIENT
Start: 2022-11-25 | End: 2022-11-25 | Stop reason: HOSPADM

## 2022-11-25 RX ORDER — EZETIMIBE 10 MG/1
10 TABLET ORAL NIGHTLY
Status: DISCONTINUED | OUTPATIENT
Start: 2022-11-25 | End: 2022-11-25 | Stop reason: RX

## 2022-11-25 RX ORDER — MINERAL OIL AND WHITE PETROLATUM 150; 830 MG/G; MG/G
OINTMENT OPHTHALMIC EVERY 4 HOURS
Status: DISCONTINUED | OUTPATIENT
Start: 2022-11-25 | End: 2022-11-25 | Stop reason: HOSPADM

## 2022-11-25 RX ORDER — ATORVASTATIN CALCIUM 40 MG/1
40 TABLET, FILM COATED ORAL NIGHTLY
Status: DISCONTINUED | OUTPATIENT
Start: 2022-11-25 | End: 2022-11-25 | Stop reason: HOSPADM

## 2022-11-25 RX ADMIN — Medication 150 MCG/HR: at 09:28

## 2022-11-25 RX ADMIN — Medication 150 MCG/HR: at 02:32

## 2022-11-25 RX ADMIN — PANTOPRAZOLE SODIUM 40 MG: 40 INJECTION, POWDER, FOR SOLUTION INTRAVENOUS at 12:57

## 2022-11-25 RX ADMIN — ACETAMINOPHEN 650 MG: 325 TABLET ORAL at 11:32

## 2022-11-25 RX ADMIN — Medication 150 MCG/HR: at 13:02

## 2022-11-25 RX ADMIN — SODIUM CHLORIDE: 9 INJECTION, SOLUTION INTRAVENOUS at 02:05

## 2022-11-25 RX ADMIN — MINERAL OIL, PETROLATUM: 425; 573 OINTMENT OPHTHALMIC at 04:23

## 2022-11-25 RX ADMIN — CHLORHEXIDINE GLUCONATE 15 ML: 1.2 RINSE ORAL at 08:46

## 2022-11-25 RX ADMIN — SODIUM CHLORIDE 1000 ML: 9 INJECTION, SOLUTION INTRAVENOUS at 00:53

## 2022-11-25 RX ADMIN — Medication 150 MCG/HR: at 05:40

## 2022-11-25 RX ADMIN — MINERAL OIL, PETROLATUM: 425; 573 OINTMENT OPHTHALMIC at 11:31

## 2022-11-25 RX ADMIN — POLYVINYL ALCOHOL 1 DROP: 14 SOLUTION/ DROPS OPHTHALMIC at 08:47

## 2022-11-25 RX ADMIN — POLYVINYL ALCOHOL 1 DROP: 14 SOLUTION/ DROPS OPHTHALMIC at 01:47

## 2022-11-25 RX ADMIN — Medication 10 MG/HR: at 07:10

## 2022-11-25 RX ADMIN — AMIODARONE HYDROCHLORIDE 0.5 MG/MIN: 50 INJECTION, SOLUTION INTRAVENOUS at 02:27

## 2022-11-25 RX ADMIN — PROPOFOL 40 MCG/KG/MIN: 10 INJECTION, EMULSION INTRAVENOUS at 02:50

## 2022-11-25 RX ADMIN — CHLORHEXIDINE GLUCONATE 15 ML: 1.2 RINSE ORAL at 05:30

## 2022-11-25 RX ADMIN — SODIUM CHLORIDE, PRESERVATIVE FREE 10 ML: 5 INJECTION INTRAVENOUS at 08:46

## 2022-11-25 RX ADMIN — PROPOFOL 50 MCG/KG/MIN: 10 INJECTION, EMULSION INTRAVENOUS at 05:34

## 2022-11-25 ASSESSMENT — PULMONARY FUNCTION TESTS
PIF_VALUE: 22
PIF_VALUE: 21
PIF_VALUE: 25

## 2022-11-25 NOTE — PROGRESS NOTES
Hospitalist Progress Note    Name:  Kwame Westbrook    /Age/Sex: 1950  (81 y.o. male)  MRN & CSN:  [id number] & [id number]    PCP: Sonya Mercedes    Date of Admission: 2022    Patient Status:  Inpatient     Chief Complaint: Cardiac arrest    Hospital Course:   82-year-old male with significant cardiac history had a witnessed collapse by family at home. CPR was started at home and EMS arrived and intubated in the field and perform CPR. Patient undergoing hypothermia protocol. Subjective: Today is:  Hospital Day: 2. Patient seen and examined in CVU-/2908. Intubated. Sedated. Cooling. Both sons at bedside updated on plan and disposition.       Medications:  Reviewed    Infusion Medications    sodium chloride      sodium chloride 100 mL/hr at 22 0205    phenylephrine 25 mcg/min (22 0155)    dextrose      propofol 50 mcg/kg/min (22 0534)    heparin (PORCINE) Infusion      amiodarone 0.5 mg/min (22 0227)    norepinephrine 24 mcg/min (22 0125)    midazolam 10 mg/hr (22 0710)    fentaNYL 150 mcg/hr (22 1302)     Scheduled Medications    sodium chloride flush  10 mL IntraVENous 2 times per day    chlorhexidine  15 mL Mouth/Throat BID    polyvinyl alcohol  1 drop Both Eyes Q4H    And    artificial tears   Both Eyes Q4H    insulin lispro  0-4 Units SubCUTAneous Q4H    atorvastatin  40 mg Oral Nightly    acetaminophen  650 mg Oral Q6H    pantoprazole  40 mg IntraVENous Daily    heparin (porcine)  4,000 Units IntraVENous Once     PRN Meds: sodium chloride flush, sodium chloride, potassium chloride, magnesium sulfate, promethazine **OR** ondansetron, acetaminophen **OR** acetaminophen, perflutren lipid microspheres, meperidine, dextrose bolus **OR** dextrose bolus, glucagon (rDNA), dextrose, heparin (porcine), heparin (porcine)      Intake/Output Summary (Last 24 hours) at 2022 1311  Last data filed at 2022 1200  Gross per 24 hour Intake 759.41 ml   Output 185 ml   Net 574.41 ml       Physical Exam Performed:    /85   Pulse 61   Temp (!) 91.2 °F (32.9 °C) (Bladder)   Resp (!) 7   Ht 6' 2\" (1.88 m)   Wt 294 lb 5 oz (133.5 kg)   SpO2 100%   BMI 37.79 kg/m²     General appearance: Intubated. Sedated. Nonresponsive. HEENT: Pupils equal, round, and reactive to light. Conjunctivae/corneas clear. Neck: Supple. No jugular venous distention. Trachea midline. Respiratory:  Normal respiratory effort. Mechanical breath sounds. Cardiovascular: Regular rate and rhythm with normal S1/S2 without murmurs, rubs or gallops. No peripheral edema. Abdomen: Soft, non-distended with normal bowel sounds. Musculoskeletal: No clubbing or cyanosis. No gross deformities. Skin: Skin color, texture, turgor normal.  No rashes or lesions. Neurologic:  Intubated. Sedated. Nonresponsive. Psychiatric: Intubated. Sedated. Nonresponsive.   Peripheral Pulses: +2 palpable, equal bilaterally       Labs:   Recent Labs     11/24/22 1912   WBC 9.8   HGB 13.5   HCT 43.3        Recent Labs     11/24/22 1912 11/25/22 0115 11/25/22 0305 11/25/22  0655    141  --  141   K 4.3 5.8*  --  4.8   CL 99 104  --  106   CO2 23 22  --  19*   BUN 15 24*  --  28*   CREATININE 1.8* 1.7*  --  1.8*   CALCIUM 9.7 9.1  --  9.0   PHOS 10.2*  --  5.4* 3.0     Recent Labs     11/24/22 1912 11/25/22  0655   AST 92* 136*   ALT 67* 128*   BILIDIR  --  <0.2   BILITOT 0.3 0.5   ALKPHOS 120 142*     Recent Labs     11/24/22 1912 11/25/22  0655   INR 2.43* 2.69*     Recent Labs     11/24/22 1912 11/25/22  0115   TROPONINI <0.01 0.05*       Urinalysis:      Lab Results   Component Value Date/Time    NITRU Negative 11/25/2022 01:00 AM    WBCUA 33 11/25/2022 01:00 AM    BACTERIA 4+ 11/25/2022 01:00 AM    RBCUA 1161 11/25/2022 01:00 AM    BLOODU LARGE 11/25/2022 01:00 AM    SPECGRAV 1.020 11/25/2022 01:00 AM    GLUCOSEU 100 11/25/2022 01:00 AM       Radiology:  XR CHEST PORTABLE   Final Result   Tip of ET tube projects at the level the midthoracic trachea. Cardiomegaly and left lower lobe atelectasis         XR CHEST PORTABLE   Final Result   1. Endotracheal tube terminates 6 cm above the luciana. This could be   further advanced 2-3 cm for optimal positioning. 2.  Enteric tube terminates at the level of the body of the stomach. 3.  Right internal jugular line terminates at the level of the proximal SVC. CT CHEST ABDOMEN PELVIS WO CONTRAST Additional Contrast? None   Final Result   1. Minimal opacity in the dependent right lower lobe favoring subsegmental   atelectasis. The lungs are otherwise clear. 2.  No acute inflammatory process identified in the abdomen or pelvis, within   the limits of a noncontrast exam.  Mild amount of semi solid stool burden   throughout the colon may represent underlying diarrheal process. 3.  Additional chronic findings, as described above. CT HEAD WO CONTRAST   Preliminary Result   No acute intracranial abnormality. Indeterminate 15 x 9 mm ovoid mass in the medial right orbit. Nonemergent   ophthalmology consultation and MRI can be considered. XR CHEST PORTABLE   Final Result   1. Endotracheal tube terminating 5.5 cm above the luciana. 2. No acute cardiopulmonary process identified.          MRI BRAIN WO CONTRAST    (Results Pending)           Assessment/Plan:    Active Hospital Problems    Diagnosis     HFrEF (heart failure with reduced ejection fraction) ([facility]) [I50.20]      Priority: Medium    S/P placement of cardiac pacemaker [Z95.0]      Priority: Medium    History of MI (myocardial infarction) [I25.2]      Priority: Medium    KIRIT (acute kidney injury) ([address] [N17.9]      Priority: Medium    HTN (hypertension) [I10]      Priority: Medium    HLD (hyperlipidemia) [E78.5]      Priority: Medium    Atrial fibrillation ([address] [I48.91]      Priority: Medium    Cardiac arrest ([address] [I46.9] Priority: [address] [facility] Day: 2    This is a 81 y.o. male who presented to [facility] on 11/24/2022 and is being treated for:    Cardiac arrest  -V. fib arrest received 1 shock, 4 doses of epi, 1 dose 200 mg amiodarone  -Hypothermia protocol  -On Levo and Eliazar; wean as able  -Sedated on propofol, Versed, fentanyl; continue while on hypothermia protocol, though wean afterwards as able  -Intubated; wean vent to minimal settings if possible  -IVF  -Daily labs; replace electrolytes as needed  -Cardiology consulted; appreciate recommendations  -Critical care consulted; appreciate recommendations  -Palliative care consulted    KIRIT  -Creatinine 1.8 on admission; baseline ~1.0  -Likely 2/2 cardiac arrest  -Avoid nephrotoxins  -Daily labs; monitor creatinine    HFrEF  -Echo 11/25/2022 showed LVEF 5-10% with G3 DD; no thrombus visualized  -Hold home Entresto, Coreg, Aldactone, Lasix given KIRIT and hypotension  -I/Os    Atrial fibrillation  -Hold home beta-blocker  -Amiodarone drip; defer to cardiology  -Hold home warfarin  -Heparin drip initiated    Hypertension  -Hold home antihypertensives while on pressors    Hyperlipidemia  -Statin        DVT ppx: Heparin  GI ppx: PPI  Diet: Diet NPO  Code Status: Full Code    Disposition:  Cardiac arrest.  Currently cooling. Plan to transfer patient to [facility]. PT/OT Eval Status: Will order      Patient has a high probability of imminent or life-threatening deterioration requiring close monitoring, and highly complex decision-making and/or interventions of high intensity to assess, manipulate, and support his critical organ systems to prevent decline. I personally spent 36 minutes in providing critical care. Total critical care time includes caring for direct patient contact, management of life support systems, review of data including imaging and labs, discussions with other team members and physicians, but excludes procedures.         Xiomy Marquez, DO  11/25/2022  1:11 PM      Please note that some part of this chart was generated using Dragon dictation software. Although every effort was made to ensure the accuracy of this automated transcription, some errors in transcription may have occurred inadvertently. If you may need any clarification, please do not hesitate to contact me through [facility].

## 2022-11-25 NOTE — ED NOTES
Patient came in by [facility] squad from home. Per EMS patient grabbed his chest and had witness collapse by family. Patient was given 4 epi, 300 amio and sodium bicarb en route. Patient bs was 139 had IO placed to L shin on arrival along with 6.5 ET tube. EMS stated patient has hx of wide complex tachycardia, pacemaker and had ablation 6 months ago.       Magalis Morrison RN  11/24/22 2010

## 2022-11-25 NOTE — PROGRESS NOTES
Pharmacy Medication History Note      List of current medications patient is taking is complete. Source of information: fill history, chart review     Changes made to medication list:    Medications removed (include reason, ex. therapy complete or physician discontinued):  Lisinopril 10 mg tablets - alternate therapy   Mexiletine 150 mg capsules - 1 capsule by mouth TID added; discontinued 10/10/22 per cards     Medications added/doses adjusted:  Entresto 24-26 mg tablets - 1 tablet by mouth twice daily added     Other notes (ex. Recent course of antibiotics, Coumadin dosing):   Unable to verify OTC meds with the patient at this time - vitamin C, aspirin, Vitamin D, multivitamins  Need to clarify warfarin dose when able - most recent documented dose is 5 mg daily    Sohan Márquez, PharmD, [facility]  Clinical Pharmacist  [phone]

## 2022-11-25 NOTE — ED NOTES
1mg/min Amio drip shows discontinued in MAR. This RN talked with Mary Duncan MD and stated there was another Amio drip, 0.5 mg/min ordered for 0200. MD stated he wanted the 1mg/min Amio drip infusing at this time. Restarted per [facility].      Akanksha Dominguez RN  11/24/22 7732

## 2022-11-25 NOTE — ED NOTES
[employer] Pacemaker Interrogated     Antonia Lorna, [address] [place] [address]  11/24/22 2022

## 2022-11-25 NOTE — CONSULTS
[facility]  [phone]      Chief Complaint   Patient presents with    Cardiac Arrest     Patient came in by [address] squad from home. Per EMS patient grabbed his chest and had witness collapse by family. Patient was given 4 epi, 300 amio and sodium bicarb en route. Patient bs was 139 and, had IO placed to L shin on arrival.             History of Present Illness:  Ngoc Baird is a 81 y.o. patient who presented to the hospital with complaints of cardiac arrest. He was at home with family eating dinner when he had sudden LOC and collapse. He had CPR and family called EMS who performed CPR and intubation and brought him to the ED. He was unresponsive after ROSC. He is currently on arctic sun protocol. He has h/o Vfib. Device interrogation shows Vfib and defib x4 with ICD and eventual Vpaced. I have been asked to provide consultation regarding further management and testing. He is a 73year old with ischemic cardiomyopathy, paroxysmal atrial fibrillation, heart failure and recurrent VT status post ablation. Following his second ablation he has had no further sustained VT. Past Medical History:   has a past medical history of CAD (coronary artery disease), Cardiac resynchronization therapy defibrillator (CRT-D) in place, CHF (congestive heart failure) ([facility] Hyperlipidemia, Hypertension, MI (myocardial infarction) ([facility] and Wears glasses. Surgical History:   has a past surgical history that includes Coronary angioplasty with stent (11/2015); other surgical history (Left); Colonoscopy; pacemaker placement (11/2015); and Cardiac defibrillator placement (2017). Social History:   reports that he quit smoking about 8 years ago. He has never used smokeless tobacco. He reports current alcohol use. He reports that he does not use drugs. Family History:  family history is not on file. Home Medications:  Were reviewed and are listed in nursing record.  and/or listed below  Prior to Admission medications    Medication Sig Start Date End Date Taking?  Authorizing Provider   mexiletine (MEXITIL) 150 MG capsule Take 150 mg by mouth 3 times daily   Yes Historical Provider, MD   sacubitril-valsartan (ENTRESTO) 24-26 MG per tablet Take 1 tablet by mouth 2 times daily   Yes Historical Provider, MD   atorvastatin (LIPITOR) 40 MG tablet Take 40 mg by mouth nightly    Historical Provider, MD   carvedilol (COREG) 25 MG tablet Take 25 mg by mouth 2 times daily (with meals)    Historical Provider, MD   ezetimibe (ZETIA) 10 MG tablet Take 10 mg by mouth nightly    Historical Provider, MD   furosemide (LASIX) 20 MG tablet Take 20 mg by mouth daily    Historical Provider, MD   spironolactone (ALDACTONE) 25 MG tablet Take 25 mg by mouth daily    Historical Provider, MD   warfarin (COUMADIN) 7.5 MG tablet Take 7.5 mg by mouth daily 6DAYS A WEEK-SUN,TUES,WED,THURS,FRI,SAT    Historical Provider, MD   warfarin (COUMADIN) 10 MG tablet Take 10 mg by mouth See Admin Instructions MONDAYS    Historical Provider, MD   aspirin 81 MG tablet Take 81 mg by mouth daily    Historical Provider, MD   Multiple Vitamins-Minerals (MULTIVITAMIN ADULT PO) Take 1 tablet by mouth daily    Historical Provider, MD   Ascorbic Acid (VITAMIN C) 1000 MG tablet Take 1,000 mg by mouth daily    Historical Provider, MD   Cholecalciferol (VITAMIN D3) 5000 units TABS Take 1 tablet by mouth daily    Historical Provider, MD        Current Medications:  Current Facility-Administered Medications   Medication Dose Route Frequency Provider Last Rate Last Admin    sodium chloride flush 0.9 % injection 10 mL  10 mL IntraVENous 2 times per day Serafin Thompson DO   10 mL at 11/25/22 0846    sodium chloride flush 0.9 % injection 10 mL  10 mL IntraVENous PRN Dylan Thompson, DO        0.9 % sodium chloride infusion   IntraVENous PRN Dylan Thompson DO        potassium chloride 10 mEq/100 mL IVPB (Peripheral Line)  10 mEq IntraVENous PRN Gosia Matt Donna, DO        magnesium sulfate 2000 mg in 50 mL IVPB premix  2,000 mg IntraVENous PRN Gennaro Cardozaas A Donna, DO        promethazine (PHENERGAN) tablet 12.5 mg  12.5 mg Oral Q6H PRN Ahmad A Donna, DO        Or    ondansetron (ZOFRAN) injection 4 mg  4 mg IntraVENous Q6H PRN Ahmad A Donna, DO        acetaminophen (TYLENOL) tablet 650 mg  650 mg Oral Q6H PRN Ahmad A Donna, DO        Or    acetaminophen (TYLENOL) suppository 650 mg  650 mg Rectal Q6H PRN Ahmad A Donna, DO        chlorhexidine (PERIDEX) 0.12 % solution 15 mL  15 mL Mouth/Throat BID Ahmad A Donna, DO   15 mL at 11/25/22 0846    perflutren lipid microspheres (DEFINITY) injection 1.5 mL  1.5 mL IntraVENous ONCE PRN Ahmad A Donna, DO        0.9 % sodium chloride infusion   IntraVENous Continuous Ahmad A Donna,  mL/hr at 11/25/22 0205 New Bag at 11/25/22 0205    phenylephrine (BARAK-SYNEPHRINE) 50 mg in dextrose 5 % 250 mL infusion   mcg/min IntraVENous Continuous Ahmad A Donna, DO 7.5 mL/hr at 11/25/22 0155 25 mcg/min at 11/25/22 0155    polyvinyl alcohol (LIQUIFILM TEARS) 1.4 % ophthalmic solution 1 drop  1 drop Both Eyes Q4H Ahmad A Donna, DO   1 drop at 11/25/22 0847    And    lubrifresh P.M. (artificial tears) ophthalmic ointment   Both Eyes Q4H Ahmad A Donna, DO   Given at 11/25/22 1131    meperidine (DEMEROL) injection 12.5 mg  12.5 mg IntraVENous Q3H PRN Ahmad A Donna, DO        dextrose bolus 10% 125 mL  125 mL IntraVENous PRN Ahmad A Donna, DO        Or    dextrose bolus 10% 250 mL  250 mL IntraVENous PRN Ahmad A Donna, DO        glucagon (rDNA) injection 1 mg  1 mg SubCUTAneous PRN Ahmad A Donna, DO        dextrose 10 % infusion   IntraVENous Continuous PRN Ahmad A Donna, DO        insulin lispro (HUMALOG) injection vial 0-4 Units  0-4 Units SubCUTAneous Q4H Dylan Thompson DO        propofol injection  5-50 mcg/kg/min IntraVENous Continuous Lemueld DELISA Thompson DO 40.1 mL/hr at 11/25/22 0534 50 mcg/kg/min at 11/25/22 0534 atorvastatin (LIPITOR) tablet 40 mg  40 mg Oral Nightly Boston Pardon, DO        acetaminophen (TYLENOL) tablet 650 mg  650 mg Oral Q6H Sean Pitts DO   650 mg at 11/25/22 1132    pantoprazole (PROTONIX) injection 40 mg  40 mg IntraVENous Daily Kel Pardon, DO   40 mg at 11/25/22 1257    heparin (porcine) injection 4,000 Units  4,000 Units IntraVENous Once Kel Pardon, DO        heparin (porcine) injection 4,000 Units  4,000 Units IntraVENous PRN Kel Pardon, DO        heparin (porcine) injection 2,000 Units  2,000 Units IntraVENous PRN Boston Pardon, DO        [Held by provider] heparin 25,000 units in dextrose 5% 250 mL (premix) infusion  5-30 Units/kg/hr IntraVENous Continuous Kel Pardon, DO        amiodarone (CORDARONE) 450 mg in dextrose 5 % 250 mL infusion  0.5 mg/min IntraVENous Continuous Terrence Anderson MD 16.7 mL/hr at 11/25/22 0227 0.5 mg/min at 11/25/22 0227    norepinephrine (LEVOPHED) 16 mg in sodium chloride 0.9 % 250 mL infusion  1-100 mcg/min IntraVENous Continuous Terrence Anderson MD 22.5 mL/hr at 11/25/22 0125 24 mcg/min at 11/25/22 0125    midazolam (VERSED) 1 mg/mL in D5W infusion  1-10 mg/hr IntraVENous Continuous Ward Moran MD 10 mL/hr at 11/25/22 0710 10 mg/hr at 11/25/22 0710    fentaNYL 10 mcg/mL infusion   mcg/hr IntraVENous Continuous Ward Moran MD 15 mL/hr at 11/25/22 1302 150 mcg/hr at 11/25/22 1302        Allergies:  Patient has no known allergies.      Review of Systems:     Unable to obtain      Physical Examination:    Vitals:    11/25/22 1200   BP:    Pulse: 61   Resp: (!) 7   Temp:    SpO2: 100%    Weight: 294 lb 5 oz (133.5 kg)         General Appearance:  Intubated and sedated no distress, appears stated age   Head:  Normocephalic, without obvious abnormality, atraumatic   Eyes:  PERRL, conjunctiva/corneas clear       Nose: Nares normal, no drainage or sinus tenderness   Throat: Lips, mucosa, and tongue normal   Neck: Supple, symmetrical, trachea midline, no adenopathy, thyroid: not enlarged, symmetric, no tenderness/mass/nodules, no carotid bruit or JVD       Lungs:   Rales  to auscultation bilaterally, respirations unlabored   Chest Wall:  No tenderness or deformity   Heart:  Regular rate and rhythm, S1, S2 normal, no murmur, rub or gallop   Abdomen:   Soft, non-tender, bowel sounds active all four quadrants,  no masses, no organomegaly           Extremities: Extremities normal, atraumatic, no cyanosis or edema   Pulses: 2+ and symmetric   Skin: Skin color, texture, turgor normal, no rashes or lesions                Labs  CBC:   Lab Results   Component Value Date/Time    WBC 9.8 11/24/2022 07:12 PM    RBC 5.07 11/24/2022 07:12 PM    HGB 13.5 11/24/2022 07:12 PM    HCT 43.3 11/24/2022 07:12 PM    MCV 85.4 11/24/2022 07:12 PM    RDW 14.6 11/24/2022 07:12 PM     11/24/2022 07:12 PM     CMP:    Lab Results   Component Value Date/Time     11/25/2022 06:55 AM    K 4.8 11/25/2022 06:55 AM    K 4.3 11/24/2022 07:12 PM     11/25/2022 06:55 AM    CO2 19 11/25/2022 06:55 AM    BUN 28 11/25/2022 06:55 AM    CREATININE 1.8 11/25/2022 06:55 AM    AGRATIO 0.9 11/24/2022 07:12 PM    LABGLOM 40 11/25/2022 06:55 AM    GLUCOSE 178 11/25/2022 06:55 AM    PROT 6.8 11/25/2022 06:55 AM    CALCIUM 9.0 11/25/2022 06:55 AM    BILITOT 0.5 11/25/2022 06:55 AM    ALKPHOS 142 11/25/2022 06:55 AM     11/25/2022 06:55 AM     11/25/2022 06:55 AM     PT/INR:  No results found for: PTINR  Lab Results   Component Value Date    TROPONINI 0.05 (H) 11/25/2022       EKG:  I have reviewed EKG with the following interpretation:  Impression:  paced    Pt has CAD with following history:  10/17/2022 ECG - AV sequential pacing with BiV pacing (I have personally reviewed the ECG)  3/2022 VT ablation  2/18/22  ECG - VT  1/21/2022 ECG - normal sinus rhythm,  msec (I have personally reviewed the ECG)   11/2021 Echo - EF 20-25  1/2017 Dual chamber ICD upgrade to BIV ICD  12/7/2016 ECG - sinus rhythm, old anterior and lateral infarct, Left bundle branch block with QRS duration of 130 milliseconds. (I have personally reviewed the ECG)  6/2/2016 ECG - normal sinus rhythm, old lateral infarct,  msec. 5/2016 LFTs and TSH within normal limits   3/2016 Echo - EF 20-25, LA 3.8 cm  11/2015 Med[employer] ICD   11/16/15 ECG - normal sinus rhythm, large anterior and lateral infarct with 2-3 mm ST elevation largely unchanged from 3 days ago, LBBB  msec  11/13/15 LHC - rotational thombectomy and MAYCOL to the LAD, MAYCOL OM3, EF 20  [id number] 4       Summary:   The left atrium is moderately dilated. Overall left ventricular ejection fraction is estimated to be 20-25%. The left ventricle is moderately dilated. There is severe global hypokinesis of the left ventricle. The anterior wall is akinetic. There is mild mitral annular calcification present. The left ventricular apex is akinetic. There is a pacemaker lead in the right ventricle. Aortic sclerosis present without evidence of stenosis. .   There is trace mitral regurgitation. Contrast injection was performed. Summary    Technically difficult exam due to body habitus and patient being on    ventilator. Left ventricular cavity size is severely dilated with normal left    ventricular wall thickness. Overall left ventricular systolic function appears severely reduced with an    ejection fraction that is visually estimated to be 5-10%. Definity imaging agent administered to rule out thrombus. No thrombus    visualized. There is severe diffuse hypokinesis with slight regional variations. Grade III diastolic dysfunction with elevated LV filling pressures. Avg E/e'    estimated to be 19.8. The right ventricle is mildly enlarged with normal function. Mild mitral regurgitation is present. All testing and labs listed below were personally reviewed.     Assessment  Patient Active Problem List Diagnosis    Cardiac arrest ([facility]    HFrEF (heart failure with reduced ejection fraction) ([facility])    S/P placement of cardiac pacemaker    History of MI (myocardial infarction)    KIRIT (acute kidney injury) ([facility]    HTN (hypertension)    HLD (hyperlipidemia)    Atrial fibrillation ([facility]         Plan:    I had the opportunity to review the clinical symptoms and presentation of Fani Streeter. Assessment/Plan:  Principal Problem:    Cardiac arrest [facility])  Plan: Vifb arrest s/p ICD shock and CPR. No neurologic function post arrest. Currently TTM. Rewarming start tonight. Active Problems:    HFrEF (heart failure with reduced ejection fraction) ([facility])  Plan: LVEF 5% down from 25%. Possibly stunning post arrest and defib. Known ischemic cardiomyopathy and h/o VT/VF and     Cont ventilator support. Family updated. Poor prognosis. Recommend transfer to [address] [place] [address] for continued care and possible LVAD depending on progress. Accepted to [facility] and awaiting a bed and transfer. Critical Care   Due to the high probability of clinically significant life threating deterioration of the patient's condition that required my urgent intervention, a total critical care time of >35 minutes was used. This time excludes any time that may have been spent performing procedures. This includes but not limited to vital sign monitoring, telemetry monitoring, continuous pulse oximety, IV medication, clinical response to the IV medications, documentation time , consultation time, interpretation of lab data, review of nursing notes and old record review. All questions and concerns were addressed to the patient/family. Alternatives to my treatment were discussed. The note was completed using EMR. Every effort was made to ensure accuracy; however, inadvertent computerized transcription errors may be present. I will address the patient's cardiac risk factors and adjusted pharmacologic treatment as needed.  In addition, I have reinforced the need for patient directed risk factor modification. Tobacco use was discussed with the patient and educated on the negative effects. I have asked the patient to not utilize these agents. Thank you for allowing to us to participate in the care or Ngoc Barid. Further evaluation will be based upon the patient's clinical course and testing results. All questions and concerns were addressed to the patient/family. Alternatives to my treatment were discussed. The note was completed using EMR. Every effort was made to ensure accuracy; however, inadvertent computerized transcription errors may be present.     Rustam Johnston MD,  11/25/2022 1:56 PM

## 2022-11-25 NOTE — PROGRESS NOTES
Dr. Marlon Santiago made aware of ABG results, no new orders at this time.        Electronically signed by Bety Guzman RN on 11/25/2022 at 9:09 AM

## 2022-11-25 NOTE — H&P
Hospital Medicine History & Physical      PCP: Eliana Tabares    Date of Admission: 11/24/2022    Date of Service: Pt seen/examined on 11/24/2022    Pt seen/examined face to face on and admitted as inpatient with expected LOS greater than two midnights due to medical therapy      Chief Complaint:    Chief Complaint   Patient presents with    Cardiac Arrest     Patient came in by [address] squad from home. Per EMS patient grabbed his chest and had witness collapse by family. Patient was given 4 epi, 300 amio and sodium bicarb en route. Patient bs was 139 and, had IO placed to L shin on arrival.         History Of Present Illness:      81 y.o. male who presented to [facility] with past medical history of CAD, CHF, hypertension, hyperlipidemia presented to the ED with chief complaint of cardiac arrest.    Patient was down for for 5 EXTR: Squad got home and had a witnessed collapse by family. Patient was given 4 doses of epi 200 amiodarone sodium bicarb in route his blood sugar was 139 and had an IO in the left shin. In the ED patient also had another cardiac arrest that appeared to be V. fib which shocked 1 time, his defibrillator was also shocking him in the ED. Patient received multiple doses of epi and finally achieved ROSC. Cardiology was contacted with recommendation to do hypothermia protocol.       Past Medical History:          Diagnosis Date    CAD (coronary artery disease)     Cardiac resynchronization therapy defibrillator (CRT-D) in place     CHF (congestive heart failure) ([facility]     Hyperlipidemia     Hypertension     MI (myocardial infarction) ([facility] 2015    Wears glasses        Past Surgical History:          Procedure Laterality Date    CARDIAC DEFIBRILLATOR PLACEMENT  2017    CRT    COLONOSCOPY      CORONARY ANGIOPLASTY WITH STENT PLACEMENT  11/2015    OTHER SURGICAL HISTORY Left     BENIGHN TUMOR REMOVED LEFT FOOT    PACEMAKER PLACEMENT  11/2015    ICD       Medications Prior to Admission: Prior to Admission medications    Medication Sig Start Date End Date Taking? Authorizing Provider   atorvastatin (LIPITOR) 40 MG tablet Take 40 mg by mouth nightly    Historical Provider, MD   carvedilol (COREG) 25 MG tablet Take 25 mg by mouth 2 times daily (with meals)    Historical Provider, MD   ezetimibe (ZETIA) 10 MG tablet Take 10 mg by mouth nightly    Historical Provider, MD   furosemide (LASIX) 20 MG tablet Take 20 mg by mouth daily    Historical Provider, MD   lisinopril (PRINIVIL;ZESTRIL) 10 MG tablet Take 10 mg by mouth 2 times daily    Historical Provider, MD   spironolactone (ALDACTONE) 25 MG tablet Take 25 mg by mouth daily    Historical Provider, MD   warfarin (COUMADIN) 7.5 MG tablet Take 7.5 mg by mouth daily 6DAYS A WEEK-SUN,TUES,WED,THURS,FRI,SAT    Historical Provider, MD   warfarin (COUMADIN) 10 MG tablet Take 10 mg by mouth See Admin Instructions MONDAYS    Historical Provider, MD   aspirin 81 MG tablet Take 81 mg by mouth daily    Historical Provider, MD   Multiple Vitamins-Minerals (MULTIVITAMIN ADULT PO) Take 1 tablet by mouth daily    Historical Provider, MD   Ascorbic Acid (VITAMIN C) 1000 MG tablet Take 1,000 mg by mouth daily    Historical Provider, MD   Cholecalciferol (VITAMIN D3) 5000 units TABS Take 1 tablet by mouth daily    Historical Provider, MD       Allergies:  Patient has no known allergies. Social History:          TOBACCO:   reports that he quit smoking about 8 years ago. He has never used smokeless tobacco.  ETOH:   reports current alcohol use. E-cigarette/Vaping       Questions Responses    E-cigarette/Vaping Use Never User    Start Date     Passive Exposure     Quit Date     Counseling Given     Comments               Family History:      Family History reviewed with patient, and does not pertain and non-contributory to the current illness    No family history on file.   Unable to obtain patient encephalopathic     REVIEW OF SYSTEMS:   Unable to obtain patient encephalopathic    PHYSICAL EXAM PERFORMED:    BP (!) 92/49   Pulse 65   Temp 97.3 °F (36.3 °C) (Rectal)   Resp 21   SpO2 100%     General appearance:  mild acute distress, appears older than stated age  HEENT:   atraumatic, sclera anicteric, Conjunctivae clear. Neck: Supple,Trachea midline, no goiter  Respiratory:minimal accessory muscle usage, Normal respiratory effort. Clear to auscultation, bilaterally without wheezing  Cardiovascular:  Regular rate and rhythm, capillary refill 5 seconds  Abdomen: Soft,  -distended with normal bowel sounds. Musculoskeletal:  No clubbing, cyanosis. trace edema LE bilaterally. Skin: turgor normal.  No new rashes or lesions. Neurologic: GCS 3 T on sedation versed drip. Post ROSC was GCS 7    Labs:     Recent Labs     11/24/22 1912   WBC 9.8   HGB 13.5   HCT 43.3        Recent Labs     11/24/22 1912      K 4.3   CL 99   CO2 23   BUN 15   CREATININE 1.8*   CALCIUM 9.7     Recent Labs     11/24/22 1912   AST 92*   ALT 67*   BILITOT 0.3   ALKPHOS 120     Recent Labs     11/24/22 1912   INR 2.43*     Recent Labs     11/24/22 1912   TROPONINI <0.01       Urinalysis:    No results found for: Becky [address], RBCUA, BLOODU, [address], [address]    Radiology:     CXR: I have reviewed the CXR with the following interpretation:   No acute process  EKG:  I have reviewed the EKG with the following interpretation:   Sinus with prolonged QT  CT HEAD WO CONTRAST   Preliminary Result   No acute intracranial abnormality. Indeterminate 15 x 9 mm ovoid mass in the medial right orbit. Nonemergent   ophthalmology consultation and MRI can be considered. XR CHEST PORTABLE   Preliminary Result   1. Endotracheal tube terminating 5.5 cm above the luciana. 2. No acute cardiopulmonary process identified.          CT CHEST ABDOMEN PELVIS WO CONTRAST Additional Contrast? None    (Results Pending)       ASSESSMENT AND PLAN:    Active Hospital Problems    Diagnosis Date Noted    Cardiac arrest [facility]) [I46.9] 11/24/2022     Priority: Medium     Acute encephalopathy:  Suspected secondary to poor perfusion from cardiac arrest  CT head showing no acute intracranial bleed  Labs reviewed  Hypothermia protocol initiated for V. fib cardiac arrest    Cardiac arrest: V. 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:  MRI brain when able    Chronic medical conditions: Held home medication  Hyperlipidemia  Hypertension  CAD  Prediabetes  Hypercoagulable state on warfarin, transition to heparin once INR less than 1.5    Diet: NPO     DVT Prophylaxis: on warfarin    Dispo:   Expected LOS greater than two midnights           Dylan Thompson, DO         Total critical care time caring for this patient with life threatening, unstable organ failure, including direct patient contact, management of life support systems, review of data including imaging and labs, discussions with other team members and physicians at least 39 min so far today, excluding procedures.

## 2022-11-25 NOTE — ED NOTES
Patient transported to CVU in stable condition intubated by this RN and RT in stable condition with levo, fentanyl, versed and amio infusing per [facility] on bedside monitor.       Antonia Rothman RN  11/25/22 0000       Antonia Rotmhan RN  11/25/22 1651

## 2022-11-25 NOTE — PROGRESS NOTES
Pt arrived to floor via stretcher from ED. Admission and assessment initiated. See doc flowsheet and admission tab.

## 2022-11-25 NOTE — ED NOTES
RT called for patient transport to CT and then to CVU with this RN.      Ketty Wells RN  11/24/22 1633

## 2022-11-25 NOTE — ED NOTES
Rt called for patient transport to [address] [facility] [place], RN  11/24/22 2110

## 2022-11-25 NOTE — PROCEDURES
INDICATION:  Shock    ATTENDING PHYSICIAN: Rainer Obrien    PROCEDURE: Central venous line placement right internal jugular vein    CONSENT:  The procedure, risks, and benefits were explained to family and consent was obtained. TIME OUT:  The patient and procedure were properly identified with the nurse in the room. STERILE PREP:  Full maximum sterile field/barrier technique was followed (with cap and mask and sterile gown and sterile gloves and large sterile sheet and hand hygeine and 2% chlorhexidine for cutaneous antisepsis). LOCAL ANESTHETIC:   Aqueous lidocaine 5cc    PROCEDURE:   Using direct ultrasound guidance, a right internal jugular vein central venous catheter was placed using a modified seldinger technique without difficulty. Excellent return of non-pulsatile, dark venous blood from all lumens. All lumens were flushed with normal saline. Minimal bleeding occurred and there was hemostasis prior to conclusion of procedure. Catheter was sutured in place and a sterile dressing with biopatch was placed. COMPLICATIONS:  None  Estimated blood loss: 10 cc    CHEST XRAY REVIEWED: Post-procedure chest x-ray is pending at this time    Of CO2 on the monitor. CXR is pending.

## 2022-11-25 NOTE — ED NOTES
All verbal orders given my MD Monica    1909 1 amp bicarb  1914 v fib on monitor, cpr initiated  1915 epi  1916 shock 200 joules  1917 pulse check, patient has pulse  1918 150 amio  1922 pulse check, no pulse, cpr initiated  1923 epi  1924 1 amp bicarb  1925 PEA on monitor  1926 epi  1926 calcium  1928 epi  1928 pulse check, patient has pulse  1944 16 Fr temp sensing arita placed  1952 1 ml epi in 9 ml's in normal saline given  1955 Central line placed by Mayelin Millan 116 4 versed given, patient gagging on ET tube     Julio Cesar Seaman RN  11/24/22 2022

## 2022-11-25 NOTE — ED NOTES
Patient transported to CT by this RN and RT on bedside monitor in stable condition.       Pancho Osei RN  11/24/22 1189

## 2022-11-25 NOTE — PROGRESS NOTES
Nutrition Note    RECOMMENDATIONS  PO Diet: NPO  Nutrition Support: Not appropriate to initiate enteral nutrition at this time. NUTRITION ASSESSMENT   Pt triggered d/t vent status. Intubated 11/24 s/p cardiac arrest. Receiving NS at 100 mL/hr, amiodarone, fentanyl, versed, norepinephrine at 24 mcg/min, phenylephrine at 25 mcg/min, and propofol at 40.1 mL/hr. OG tube in place Hypothermia protocol in place. Not appropriate for TF at this time given pressor support/until pt is rewarmed. RD will continue to monitor. Nutrition Related Findings: K+ 5.8, Phos 5.4, Ca 1.11. No BM documented. Trace generalized edema. Wounds: None  Nutrition Education:  Education not indicated   Nutrition Goals: Initiate nutrition support, by next RD assessment     MALNUTRITION ASSESSMENT   Acute Illness  Malnutrition Status: No malnutrition    NUTRITION DIAGNOSIS   Inadequate oral intake related to impaired respiratory function as evidenced by NPO or clear liquid status due to medical condition, intubation    CURRENT NUTRITION THERAPIES  Diet NPO     PO Intake: NPO   PO Supplement Intake:NPO    Current Tube Feeding (TF) Recs:  Feeding Route: Orogastric   Goal TF & Flush Orders Provides: Vital AF 1.2 at 75 mL/hr provides 1800 mL total volume, 2160 calories, 135 grams of protein, and 1460 mL free water. 115 mL H20 q 4 hours meets estimated fluid needs. 1 bottle of Proteinex P2Go daily. ANTHROPOMETRICS  Current Height: 6' 2\" (188 cm)  Current Weight: 294 lb 5 oz (133.5 kg)    Admission weight: 294 lb (133.4 kg) (bed wt)  Ideal Body Weight (IBW): 190 lbs  (86 kg)        BMI: 37.7    COMPARATIVE STANDARDS  Energy (kcal):  2746-5650     Protein (g):  172       Fluid (mL/day):  6526-5478    The patient will be monitored per nutrition standards of care. Consult dietitian if additional nutrition interventions are needed prior to RD reassessment.      Turner Choi MS, RD, LD    Contact: [phone]

## 2022-11-25 NOTE — PROGRESS NOTES
Dr. Gabriella Zimmerman [facility] transfer [place] called for patient transfer to [facility] d/t patient cardiologist at [facility]. Placed call.   Mattie Warner RN

## 2022-11-25 NOTE — PROGRESS NOTES
Per cxr ETT needs to be progressed 2-3 cm, per perfectserve with Angella Caal wants tube progressed by RT 2.5 cm.  RT called to bedside to progress ett

## 2022-11-25 NOTE — ED NOTES
Verbal order form MD Monica for 8 mg Versed IVP to get patient sedated enough for CT since he is still jerking and biting ET tube.       Julio Cesar Seaman RN  11/24/22 2115

## 2022-11-25 NOTE — ED NOTES
Patient hypotensive and MD aware. Verbal order for 2L NS at this time.       Sidney Tabor RN  11/24/22 2026

## 2022-11-25 NOTE — DISCHARGE SUMMARY
Hospital Medicine Discharge Summary    Name:  Yana Dyer  Gender: male  : 1950  81 y.o. MRN: [id number]    PCP: Karis Pinzon     Date of Admission:  2022  7:08 PM  Discharge Date: 2022    Admitting Physician: Viktoriya Natarajan DO  Discharge Physician: Shadia Collado DO    Communication to PCP  -Cardiac arrest  -Transferred to [facility]    Discharge Diagnoses: Active Hospital Problems    Diagnosis     HFrEF (heart failure with reduced ejection fraction) ([facility]) [I50.20]      Priority: Medium    S/P placement of cardiac pacemaker [Z95.0]      Priority: Medium    History of MI (myocardial infarction) [I25.2]      Priority: Medium    KIRIT (acute kidney injury) ([facility] [address] [N17.9]      Priority: Medium    HTN (hypertension) [I10]      Priority: Medium    HLD (hyperlipidemia) [E78.5]      Priority: Medium    Atrial fibrillation ([facility] [address] [I48.91]      Priority: Medium    Cardiac arrest [facility]) [I46.9]      Priority: Medium       The patient was seen and examined on day of discharge and this discharge summary is in conjunction with any daily progress note from day of discharge. Hospital Course:  Yana Dyer is a 81y.o. year old male who presented to [facility] on 2022  7:08 PM.      Witnessed collapse by family at home. CPR was started at home and EMS arrived and intubated in the field and perform CPR. Patient underwent hypothermia protocol. Echo showed LVEF 5-10% on admission. Cardiology saw the patient and decided that the patient should be transferred to [facility]. Physical Exam Performed:     /85   Pulse 61   Temp (!) 91.2 °F (32.9 °C) (Bladder)   Resp (!) 7   Ht 6' 2\" (1.88 m)   Wt 294 lb 5 oz (133.5 kg)   SpO2 100%   BMI 37.79 kg/m²       General appearance: Intubated. Sedated. Nonresponsive. HEENT: Pupils equal, round, and reactive to light. Conjunctivae/corneas clear. Neck: Supple. No jugular venous distention.  Trachea midline. Respiratory:  Normal respiratory effort. Mechanical breath sounds. Cardiovascular: Regular rate and rhythm with normal S1/S2 without murmurs, rubs or gallops. No peripheral edema. Abdomen: Soft, non-distended with normal bowel sounds. Musculoskeletal: No clubbing or cyanosis. No gross deformities. Skin: Skin color, texture, turgor normal.  No rashes or lesions. Neurologic:  Intubated. Sedated. Nonresponsive. Psychiatric: Intubated. Sedated. Nonresponsive. Peripheral Pulses: +2 palpable, equal bilaterally       Labs: For convenience and continuity at follow-up the following most recent labs are provided:      CBC:    Lab Results   Component Value Date/Time    WBC 9.8 11/24/2022 07:12 PM    HGB 13.5 11/24/2022 07:12 PM    HCT 43.3 11/24/2022 07:12 PM     11/24/2022 07:12 PM       Renal:    Lab Results   Component Value Date/Time     11/25/2022 06:55 AM    K 4.8 11/25/2022 06:55 AM    K 4.3 11/24/2022 07:12 PM     11/25/2022 06:55 AM    CO2 19 11/25/2022 06:55 AM    BUN 28 11/25/2022 06:55 AM    CREATININE 1.8 11/25/2022 06:55 AM    CALCIUM 9.0 11/25/2022 06:55 AM    PHOS 3.0 11/25/2022 06:55 AM         Significant Diagnostic Studies    Radiology:   XR CHEST PORTABLE   Final Result   Tip of ET tube projects at the level the midthoracic trachea. Cardiomegaly and left lower lobe atelectasis         XR CHEST PORTABLE   Final Result   1. Endotracheal tube terminates 6 cm above the luciana. This could be   further advanced 2-3 cm for optimal positioning. 2.  Enteric tube terminates at the level of the body of the stomach. 3.  Right internal jugular line terminates at the level of the proximal SVC. CT CHEST ABDOMEN PELVIS WO CONTRAST Additional Contrast? None   Final Result   1. Minimal opacity in the dependent right lower lobe favoring subsegmental   atelectasis. The lungs are otherwise clear.       2.  No acute inflammatory process identified in the abdomen or pelvis, within   the limits of a noncontrast exam.  Mild amount of semi solid stool burden   throughout the colon may represent underlying diarrheal process. 3.  Additional chronic findings, as described above. CT HEAD WO CONTRAST   Preliminary Result   No acute intracranial abnormality. Indeterminate 15 x 9 mm ovoid mass in the medial right orbit. Nonemergent   ophthalmology consultation and MRI can be considered. XR CHEST PORTABLE   Final Result   1. Endotracheal tube terminating 5.5 cm above the luciana. 2. No acute cardiopulmonary process identified. MRI BRAIN WO CONTRAST    (Results Pending)          Consults:     IP CONSULT TO SPIRITUAL SERVICES  IP CONSULT TO CARDIOLOGY  IP CONSULT TO PALLIATIVE CARE  IP CONSULT TO CRITICAL CARE    F/U APPTS:  No follow-up provider specified.     Diet:   TF      Activity:  activity as tolerated    Disposition:  Discharged to  [address] [place] [address] [place]:  unknown    Condition at Discharge: Unstable    Code Status:  Full Code     Discharge Medications:     Current Discharge Medication List             Details   mexiletine (MEXITIL) 150 MG capsule Take 150 mg by mouth 3 times daily      sacubitril-valsartan (ENTRESTO) 24-26 MG per tablet Take 1 tablet by mouth 2 times daily      atorvastatin (LIPITOR) 40 MG tablet Take 40 mg by mouth nightly      carvedilol (COREG) 25 MG tablet Take 25 mg by mouth 2 times daily (with meals)      ezetimibe (ZETIA) 10 MG tablet Take 10 mg by mouth nightly      furosemide (LASIX) 20 MG tablet Take 20 mg by mouth daily      spironolactone (ALDACTONE) 25 MG tablet Take 25 mg by mouth daily      !! warfarin (COUMADIN) 7.5 MG tablet Take 7.5 mg by mouth daily 6DAYS A WEEK-SUN,TUES,WED,THURS,FRI,SAT      !! warfarin (COUMADIN) 10 MG tablet Take 10 mg by mouth See Admin Instructions MONDAYS      aspirin 81 MG tablet Take 81 mg by mouth daily      Multiple Vitamins-Minerals (MULTIVITAMIN ADULT PO) Take 1 tablet by mouth daily      Ascorbic Acid (VITAMIN C) 1000 MG tablet Take 1,000 mg by mouth daily      Cholecalciferol (VITAMIN D3) 5000 units TABS Take 1 tablet by mouth daily       ! ! - Potential duplicate medications found. Please discuss with provider. Total time spent on discharge was 35 minutes in the examination, evaluation, counseling and review of medications and discharge plan.       Signed:    Jemima Maldonado DO   11/25/2022  1:39 PM

## 2022-11-25 NOTE — ED PROVIDER NOTES
[facility]  eMERGENCY dEPARTMENTeNCOUnter      Pt Name: Fani Streeter  MRN: [id number]  [place] 1950  Date ofevaluation: 11/24/2022  Provider: Francisco Whalen MD    CHIEF COMPLAINT       Chief Complaint   Patient presents with    Cardiac Arrest     Patient came in by [address] squad from home. Per EMS patient grabbed his chest and had witness collapse by family. Patient was given 4 epi, 300 amio and sodium bicarb en route. Patient bs was 139 and, had IO placed to L shin on arrival.          HISTORY OF PRESENT ILLNESS   (Location/Symptom, Timing/Onset,Context/Setting, Quality, Duration, Modifying Factors, Severity)  Note limiting factors. Fani Streeter is a 81 y.o. male who  has a past medical history of CAD (coronary artery disease), Cardiac resynchronization therapy defibrillator (CRT-D) in place, CHF (congestive heart failure) ([facility] [address] Hyperlipidemia, Hypertension, MI (myocardial infarction) ([facility] [address] and Wears glasses. presents to the emergency department with EMS for cardiac arrest.  Patient had a witnessed arrest by his family and EMS states that they arrived on scene approximately 3 to 4 minutes after the patient was down. CPR was started and patient was found to be in V. fib. Patient was shocked 1 time and had ROSC in route to the hospital.  EMS states that he gave 1 round of epi. Also 1 round of amiodarone 300 mg. Patient does have history of V. Tach status post ablation per EMS. After family arrived, wife states that the patient was not feeling well after eating dinner. States that they finished eating around 530. States that the patient was complaining of shortness of breath for approximately half an hour prior to his arrest.  Wife states that the patient asked her to call 911 just prior to arresting. The history is provided by the EMS personnel, the spouse and medical records. NursingNotes were reviewed. Pt was seen during the [place] 19 pandemic.  Appropriate PPE worn by ME during patient encounters. Patient was cared for during a time with constrained hospital bed capacity with nationwide stress on resources and staffing. REVIEW OF SYSTEMS    (2-9 systems for level 4, 10 or more for level 5)     Review of Systems   Unable to perform ROS: Acuity of condition     Except as noted above the remainder of the review of systems was reviewed and negative.        PAST MEDICAL HISTORY     Past Medical History:   Diagnosis Date    CAD (coronary artery disease)     Cardiac resynchronization therapy defibrillator (CRT-D) in place     CHF (congestive heart failure) ([facility] [address]     Hyperlipidemia     Hypertension     MI (myocardial infarction) ([facility] [address] 2015    Wears glasses          SURGICALHISTORY       Past Surgical History:   Procedure Laterality Date    CARDIAC DEFIBRILLATOR PLACEMENT  2017    CRT    COLONOSCOPY      CORONARY ANGIOPLASTY WITH STENT PLACEMENT  11/2015    OTHER SURGICAL HISTORY Left     BENIGHN TUMOR REMOVED LEFT FOOT    PACEMAKER PLACEMENT  11/2015    ICD         CURRENT MEDICATIONS       Current Discharge Medication List        CONTINUE these medications which have NOT CHANGED    Details   mexiletine (MEXITIL) 150 MG capsule Take 150 mg by mouth 3 times daily      sacubitril-valsartan (ENTRESTO) 24-26 MG per tablet Take 1 tablet by mouth 2 times daily      atorvastatin (LIPITOR) 40 MG tablet Take 40 mg by mouth nightly      carvedilol (COREG) 25 MG tablet Take 25 mg by mouth 2 times daily (with meals)      ezetimibe (ZETIA) 10 MG tablet Take 10 mg by mouth nightly      furosemide (LASIX) 20 MG tablet Take 20 mg by mouth daily      spironolactone (ALDACTONE) 25 MG tablet Take 25 mg by mouth daily      !! warfarin (COUMADIN) 7.5 MG tablet Take 7.5 mg by mouth daily 6DAYS A WEEK-SUN,TUES,WED,THURS,FRI,SAT      !! warfarin (COUMADIN) 10 MG tablet Take 10 mg by mouth See Admin Instructions MONDAYS      aspirin 81 MG tablet Take 81 mg by mouth daily      Multiple Vitamins-Minerals (MULTIVITAMIN ADULT PO) Take 1 tablet by mouth daily      Ascorbic Acid (VITAMIN C) 1000 MG tablet Take 1,000 mg by mouth daily      Cholecalciferol (VITAMIN D3) 5000 units TABS Take 1 tablet by mouth daily       ! ! - Potential duplicate medications found. Please discuss with provider. Patient has no known allergies. FAMILY HISTORY     No family history on file. SOCIAL HISTORY       Social History     Socioeconomic History    Marital status:    Tobacco Use    Smoking status: Former     Types: Cigarettes     Quit date: 2014     Years since quittin.3    Smokeless tobacco: Never   Vaping Use    Vaping Use: Never used   Substance and Sexual Activity    Alcohol use: Yes     Comment: MODERATELY    Drug use: No       SCREENINGS    [place] Coma Scale  Eye Opening: None  Best Verbal Response: None  Best Motor Response: None  [place] Coma Scale Score: 3  OPO Notified: Yes  Date OPO Notified: 22  Time OPO Notified: 9200        GRSCUFNJ QIMV    (up to 7 for level 4, 8 or more for level 5)     ED Triage Vitals   BP Temp Temp src Pulse Resp SpO2 Height Weight   -- -- -- -- -- -- -- --       Physical Exam  Constitutional:       Appearance: He is obese. He is ill-appearing. Interventions: He is intubated. Comments: Unresponsive   HENT:      Head: Normocephalic. Comments: ET tube in the oropharynx  Eyes:      Comments: Pupils 3 mm and sluggish bilaterally   Cardiovascular:      Comments: Initially had palpable carotid pulse and was tachycardic. Pulmonary:      Effort: He is intubated. Comments: Normal breath sounds and patient was bagged bilaterally  Chest:      Comments: Excoriation over the center chest from where the Beth Graver was  Abdominal:      Comments: Obese abdomen, no signs of trauma   Neurological:      Mental Status: He is unresponsive.       Comments: GCS 3, intubated       RESULTS     EKG: All EKG's are interpreted by the Emergency Department Physician who either signs or Co-signsthis chart in the absence of a cardiologist.    2837  Sinus rhythm with fusion complexes and a rate of 62, rate of 62, , , QTc 397, ST depressions inferior leads, elevation V2, ST depressions V4 V5 V6, no prior EKG on file. 1916  The Ekg interpreted by me shows  Paced rhythm with wide complex with nonspecific intraventricular block, rate of 95, WI 84, , QTc prolonged at 625, fusion complexes no longer seen, ST depressions in inferior leads with elevation V2, ST depressions V4 V5 V6        RADIOLOGY:   Non-plain filmimages such as CT, Ultrasound and MRI are read by the radiologist. Plain radiographic images are visualized and preliminarily interpreted by the emergency physician with the below findings:    Interpretation per the Radiologist below, if available at the time ofthis note:    XR CHEST PORTABLE   Final Result   Tip of ET tube projects at the level the midthoracic trachea. Cardiomegaly and left lower lobe atelectasis         XR CHEST PORTABLE   Final Result   1. Endotracheal tube terminates 6 cm above the luciana. This could be   further advanced 2-3 cm for optimal positioning. 2.  Enteric tube terminates at the level of the body of the stomach. 3.  Right internal jugular line terminates at the level of the proximal SVC. CT CHEST ABDOMEN PELVIS WO CONTRAST Additional Contrast? None   Final Result   1. Minimal opacity in the dependent right lower lobe favoring subsegmental   atelectasis. The lungs are otherwise clear. 2.  No acute inflammatory process identified in the abdomen or pelvis, within   the limits of a noncontrast exam.  Mild amount of semi solid stool burden   throughout the colon may represent underlying diarrheal process. 3.  Additional chronic findings, as described above. CT HEAD WO CONTRAST   Preliminary Result   No acute intracranial abnormality. Indeterminate 15 x 9 mm ovoid mass in the medial right orbit. Nonemergent   ophthalmology consultation and MRI can be considered. XR CHEST PORTABLE   Final Result   1. Endotracheal tube terminating 5.5 cm above the luciana. 2. No acute cardiopulmonary process identified.          MRI BRAIN WO CONTRAST    (Results Pending)         ED BEDSIDE ULTRASOUND:   Performed by ED Physician - none    LABS:  Labs Reviewed   COMPREHENSIVE METABOLIC PANEL W/ REFLEX TO MG FOR LOW K - Abnormal; Notable for the following components:       Result Value    Anion Gap 19 (*)     Glucose 227 (*)     Creatinine 1.8 (*)     Est, Glom Filt Rate 40 (*)     Albumin 3.2 (*)     Albumin/Globulin Ratio 0.9 (*)     ALT 67 (*)     AST 92 (*)     All other components within normal limits   MAGNESIUM - Abnormal; Notable for the following components:    Magnesium 2.50 (*)     All other components within normal limits   PROTIME-INR - Abnormal; Notable for the following components:    Protime 26.5 (*)     INR 2.43 (*)     All other components within normal limits   APTT - Abnormal; Notable for the following components:    aPTT 43.7 (*)     All other components within normal limits   LACTIC ACID - Abnormal; Notable for the following components:    Lactic Acid 5.7 (*)     All other components within normal limits    Narrative:     CALL  [facility] tel. [phone],  Chemistry results called to and read back by SHANTEL Laws, 11/24/2022  20:59, by Daniel Sommer   PHOSPHORUS - Abnormal; Notable for the following components:    Phosphorus 10.2 (*)     All other components within normal limits   URINALYSIS - Abnormal; Notable for the following components:    Color, UA DARK YELLOW (*)     Clarity, UA TURBID (*)     Glucose, Ur 100 (*)     Ketones, Urine TRACE (*)     Blood, Urine LARGE (*)     Leukocyte Esterase, Urine TRACE (*)     All other components within normal limits   URINE DRUG SCREEN - Abnormal; Notable for the following components:    Benzodiazepine Screen, Urine POSITIVE (*)     All other components within normal limits   ACETAMINOPHEN LEVEL - Abnormal; Notable for the following components:    Acetaminophen Level <5 (*)     All other components within normal limits   SALICYLATE LEVEL - Abnormal; Notable for the following components:    Salicylate, Serum <1.2 (*)     All other components within normal limits   BLOOD GAS, ARTERIAL - Abnormal; Notable for the following components:    pH, Arterial 7.231 (*)     pCO2, Arterial 58.8 (*)     pO2, Arterial 386.0 (*)     Base Excess, Arterial -4.0 (*)     All other components within normal limits   BASIC METABOLIC PANEL - Abnormal; Notable for the following components:    Potassium 5.8 (*)     Glucose 179 (*)     BUN 24 (*)     Creatinine 1.7 (*)     Est, Glom Filt Rate 43 (*)     All other components within normal limits   BASIC METABOLIC PANEL - Abnormal; Notable for the following components:    CO2 19 (*)     Glucose 178 (*)     BUN 28 (*)     Creatinine 1.8 (*)     Est, Glom Filt Rate 40 (*)     All other components within normal limits   PHOSPHORUS - Abnormal; Notable for the following components:    Phosphorus 5.4 (*)     All other components within normal limits    Narrative:     0305   CALCIUM, IONIZED - Abnormal; Notable for the following components:    pH, [place] 7.286 (*)     All other components within normal limits   CALCIUM, IONIZED - Abnormal; Notable for the following components:    Calcium, Ionized 1.11 (*)     pH, [place] 7.303 (*)     All other components within normal limits   APTT - Abnormal; Notable for the following components:    aPTT 40.5 (*)     All other components within normal limits   HEPATIC FUNCTION PANEL - Abnormal; Notable for the following components:    Albumin 3.2 (*)     Alkaline Phosphatase 142 (*)      (*)      (*)     All other components within normal limits   PROTIME-INR - Abnormal; Notable for the following components:    Protime 28.7 (*)     INR 2.69 (*)     All other components within normal limits    Narrative: 9235   TROPONIN - Abnormal; Notable for the following components:    Troponin 0.05 (*)     All other components within normal limits   MICROSCOPIC URINALYSIS - Abnormal; Notable for the following components:    Bacteria, UA 4+ (*)     Hyaline Casts, UA 32 (*)     WBC, UA 33 (*)     RBC, UA 1161 (*)     Hyaline Casts, UA Present (*)     All other components within normal limits   BLOOD GAS, ARTERIAL - Abnormal; Notable for the following components:    pH, Arterial 7.296 (*)     pCO2, Arterial 45.2 (*)     pO2, Arterial 118.0 (*)     Base Excess, Arterial -4.6 (*)     All other components within normal limits   POCT GLUCOSE - Abnormal; Notable for the following components:    POC Glucose 152 (*)     All other components within normal limits   POCT GLUCOSE - Abnormal; Notable for the following components:    POC Glucose 161 (*)     All other components within normal limits   POCT ARTERIAL - Abnormal; Notable for the following components:    POC Glucose 160 (*)     pH, Arterial 7.262 (*)     pCO2, Arterial 48.1 (*)     pO2, Arterial 126.0 (*)     Base Excess, Arterial -5 (*)     All other components within normal limits   POCT ARTERIAL - Abnormal; Notable for the following components:    POC Glucose 148 (*)     pH, Arterial 7.338 (*)     pO2, Arterial 113.9 (*)     Base Excess, Arterial -5 (*)     All other components within normal limits   CULTURE, BLOOD 1   CULTURE, BLOOD 2   RESPIRATORY PANEL, MOLECULAR, WITH COVID-19   CULTURE, RESPIRATORY   CBC   TROPONIN   SPECIMEN REJECTION   PROCALCITONIN   ETHANOL   LACTIC ACID   MAGNESIUM    Narrative:     9108   MAGNESIUM   MAGNESIUM   PHOSPHORUS   BLOOD GAS, ARTERIAL   BLOOD GAS, ARTERIAL   LACTIC ACID    Narrative:     8385   HEPATITIS PANEL, ACUTE   BLOOD GAS, VENOUS   CALCIUM, IONIZED   LACTIC ACID   HEMOGLOBIN A1C   LACTIC ACID   MAGNESIUM   CALCIUM, IONIZED   BLOOD GAS, ARTERIAL   RENAL FUNCTION PANEL   RENAL FUNCTION PANEL   LACTIC ACID   MAGNESIUM   CALCIUM, IONIZED BLOOD GAS, ARTERIAL   APTT   ANTI-XA, UNFRACTIONATED HEPARIN   ANTI-XA, UNFRACTIONATED HEPARIN   RENAL FUNCTION PANEL   POCT GLUCOSE   POCT GLUCOSE   POCT GLUCOSE   POCT GLUCOSE   POCT GLUCOSE   POCT GLUCOSE   POCT GLUCOSE       All other labs were within normal range or not returned as of this dictation. EMERGENCY DEPARTMENT COURSE and DIFFERENTIAL DIAGNOSIS/MDM:   Vitals:    Vitals:    11/25/22 1030 11/25/22 1100 11/25/22 1130 11/25/22 1200   BP:       Pulse: 60 60 61 61   Resp: (!) 7 (!) 8 11 (!) 7   Temp:       TempSrc:       SpO2: 100% 100% 100% 100%   Weight:       Height:             MDM  Number of Diagnoses or Management Options  Cardiac arrest with ventricular fibrillation (HCC)  Elevated lactic acid level  Hypomagnesemia  Diagnosis management comments: Cardiac arrest, V. fib arrest, V. Tach    Patient arrives after ROSC with cardiac arrest.  Patient lost pulses shortly after arriving the emergency room. Was already intubated in the field. Normal breath sounds bilaterally on her initial evaluation. EMS states the patient was active trying to breathe on his own as well as moving occasionally in route to the hospital.  No purposeful movement on arrival.  Patient's pupil sluggish. Patient was resuscitated with ACLS protocol. Did require defibrillation x1. Patient was in V. fib. Loss pulses a second time and patient was in PEA on pulse check. 2 rounds of epinephrine were given. Patient had palpable pulse on third pulse check.        Amount and/or Complexity of Data Reviewed  Clinical lab tests: ordered and reviewed  Tests in the radiology section of CPT®: ordered and reviewed  Tests in the medicine section of CPT®: ordered and reviewed  Review and summarize past medical records: yes  Discuss the patient with other providers: yes  Independent visualization of images, tracings, or specimens: yes    Risk of Complications, Morbidity, and/or Mortality  Presenting problems: high  Diagnostic procedures: high  Management options: high    Critical Care  Total time providing critical care:  minutes    Patient Progress  Patient progress: stable        REASSESSMENT     ED Course as of 11/25/22 1411   Thu Nov 24, 2022   Severo Lindsay Dr. Briant Rattler, cardiology, discussed patient's V. fib arrest.  He recommends starting patient on hypothermia protocol. No plan for Cath Lab at this time. Recommend stabilizing patient medically and admission to ICU. Also recommend starting patient on amiodarone drip which was ordered. [DS]   2028 Spoke with [employer]. Nonsustained vtach 1819. Atrial arrhythmia at 1842.  8058-1715 4 shocks for v tach. Possible undersensing right ventricular lead. [DS]   2032 Patient now waking up and opening his eyes. Also biting the ET tube Versed ordered for sedation. Family updated. Troponin negative therefore will hold off on heparin. Creatinine 1.8, BUN 15. Lactic acid 5.7. [DS]   2109 The charge nurse in the ED states that there is a policy that the patient cannot be cooled in the ED with ice packs because there is no way to monitor the patient's temperature. Ice packs were initially on the patient but were removed by the charge nurse. [DS]   2146 Patient's chest x-ray shows appropriate tube positioning of the ET tube and no acute cardiopulmonary abnormality. [DS]   2149 Patient is waking up and will fight the tube occasionally. Will add on fentanyl drip as well. [DS]   7553 Dr. 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 After discussion decision was made to admit patient here at [facility] to stabilize the patient and if patient stabilizes and has abnormal finding on MRI they can consult from the floor. Patient will be admitted to ICU here.  [DS]      ED Course User Index  [DS] Lorenza Roman MD       Is this patient to be included in the SEP-1 Core Measure due to severe sepsis or septic shock? No   Exclusion criteria - the patient is NOT to be included for SEP-1 Core Measure due to: Infection is not suspected      CRITICAL CARE TIME   Total Critical Care time was 90 minutes, excluding separatelyreportable procedures. There was a high probability ofclinically significant/life threatening deterioration in the patient's condition which required my urgent intervention. Cardiac arrest    CONSULTS:  IP CONSULT TO SPIRITUAL SERVICES  IP CONSULT TO CARDIOLOGY  IP CONSULT TO PALLIATIVE CARE  IP CONSULT TO CRITICAL CARE    PROCEDURES:  Unless otherwise noted below, none     Central Line    Date/Time: 11/25/2022 2:08 PM  Performed by: Patricia Baez MD  Authorized by: Patricia Baez MD     Consent:     Consent obtained:  Emergent situation  Universal protocol:     Patient identity confirmed:  Arm band  Pre-procedure details:     Indication(s): central venous access      Hand hygiene: Hand hygiene performed prior to insertion      Sterile barrier technique:  All elements of maximal sterile technique followed      Skin preparation:  Alcohol    Skin preparation agent: Skin preparation agent completely dried prior to procedure    Sedation:     Sedation type:  None  Anesthesia:     Anesthesia method:  Local infiltration    Local anesthetic:  Lidocaine 1% w/o epi  Procedure details:     Location:  R femoral    Site selection rationale:  Clean site in the groin and easiest access secondary to patient being intubated    Patient position:  Supine    Procedural supplies:  Triple lumen    Catheter size:  7 Fr    Landmarks identified: yes      Ultrasound guidance: yes      Ultrasound guidance timing: prior to insertion and real time      Sterile ultrasound techniques: Sterile gel and sterile probe covers were used      Number of attempts:  1    Successful placement: yes    Post-procedure details:     Post-procedure:  Dressing applied and line sutured    Assessment:  Blood return through all ports and free fluid flow    Procedure completion:  Tolerated well, no immediate complications    I am the Primary Clinician of Record     FINAL IMPRESSION      1. Cardiac arrest with ventricular fibrillation (HCC)    2. Elevated lactic acid level    3. Hypermagnesemia          DISPOSITION/PLAN   DISPOSITION Admitted 11/24/2022 10:12:00 PM      PATIENT REFERREDTO:  No follow-up provider specified.     DISCHARGEMEDICATIONS:  Current Discharge Medication List             (Please note that portions of this note were completed with a voice recognition program.  Efforts were made to edit the dictations but occasionally words are mis-transcribed.)    Jeannie Hood MD (electronically signed)  Attending Emergency Physician          Jeannie Hood MD  11/24/22 0507       Jeannie Hood MD  11/24/22 Smith Cm MD  11/24/22 4516       Jeannie Hood MD  11/25/22 [address], MD  11/25/22 1411

## 2022-11-26 LAB
ESTIMATED AVERAGE GLUCOSE: 119.8 MG/DL
HBA1C MFR BLD: 5.8 %

## 2022-11-27 LAB
CULTURE, RESPIRATORY: ABNORMAL
CULTURE, RESPIRATORY: ABNORMAL
GRAM STAIN RESULT: ABNORMAL
ORGANISM: ABNORMAL
REPORT: NORMAL

## 2022-11-29 LAB — CULTURE, BLOOD 2: NORMAL

## 2022-12-02 LAB
BLOOD CULTURE, ROUTINE: ABNORMAL
ORGANISM: ABNORMAL
